# Patient Record
Sex: MALE | Race: OTHER | NOT HISPANIC OR LATINO | URBAN - METROPOLITAN AREA
[De-identification: names, ages, dates, MRNs, and addresses within clinical notes are randomized per-mention and may not be internally consistent; named-entity substitution may affect disease eponyms.]

---

## 2017-11-16 VITALS
DIASTOLIC BLOOD PRESSURE: 51 MMHG | RESPIRATION RATE: 18 BRPM | TEMPERATURE: 98 F | HEART RATE: 62 BPM | SYSTOLIC BLOOD PRESSURE: 93 MMHG | OXYGEN SATURATION: 97 %

## 2017-11-16 NOTE — H&P ADULT - ASSESSMENT
77 yr old M with PMHx of HTN, hyperlipidemia, DM, stage III CKD, CAD s/p prior CABG, with CCS Angina Class III equivalent symptoms and abnormal NST who presents to [facility] for recommended cardiac catheterization with possible intervention.    ASA III            Mallampati II    Risks & benefits of procedure and alternative therapy have been explained to the patient including but not limited to: allergic reaction, bleeding w/possible need for blood transfusion, infection, renal and vascular compromise, limb damage, arrhythmia, stroke, vessel dissection/perforation, Myocardial infarction, emergent CABG. Informed consent obtained and in chart.

## 2017-11-16 NOTE — H&P ADULT - PMH
CAD (coronary artery disease)    CKD (chronic kidney disease) stage 3, GFR 30-59 ml/min    Diabetes    HLD (hyperlipidemia)    HTN (hypertension)

## 2017-11-16 NOTE — H&P ADULT - HISTORY OF PRESENT ILLNESS
Patient is a 78yo M with PMHx of CAD s/p CABG (1996), CKD stage III, IDDM, HLD, HTN, gout, glaucoma, PVD and chronic venous insufficiency s/p left endovenous laser tx who presented to his cardiologist's office complaining of 7-8/10 pressure-like SSCP with radiation to the shoulder with associated SOB on ambulation of 1 block, resolving with rest. Patient also endorses 3 pillow orthopnea and PND. Additionally, patient has intermittent claudication, R>L, on ambulation that starts at the thigh and radiates down to the feet, resolving after a few minutes of rest. NST 10/13/17 revealed moderate sized, severe, reversible myocardial perfusion defect in the inferior and inferolateral wall, EF 69%. Echo on 10/13/17 revealed EF 55-60%, no wall motion abnormalities, trace AR, mild MR, PAP 37mmHg, mild TR. In light of patient's risk factors, class III anginal symptoms and abnormal NST, patient is now referred to [facility] for recommended cardiac catheterization with possible intervention. Patient is a 76yo M with PMHx of CAD s/p CABG (1996), CKD stage III, IDDM, HLD, HTN, gout, glaucoma, PVD and chronic venous insufficiency s/p left endovenous laser tx who presented to his cardiologist's office complaining of 7-8/10 pressure-like SSCP with radiation to the shoulder with associated SOB on ambulation of 1 block, resolving with rest. Patient also endorses 3 pillow orthopnea and PND. Additionally, patient has intermittent claudication, R>L, on ambulation that starts at the thigh and radiates down to the feet, resolving after a few minutes of rest. Patient denies fatigue, palpitations, N/V, hematochezia, melena, LE edema, dizziness, syncope. NST 10/13/17 revealed moderate sized, severe, reversible myocardial perfusion defect in the inferior and inferolateral wall, EF 69%. Echo on 10/13/17 revealed EF 55-60%, no wall motion abnormalities, trace AR, mild MR, PAP 37mmHg, mild TR. In light of patient's risk factors, class III anginal symptoms and abnormal NST, patient is now referred to [facility] for recommended cardiac catheterization with possible intervention. Patient is a 76yo M with PMHx of HTN, hyperlipidemia, DM, stage III CKD (Cr 1.8 by labs 9/2017), CAD s/p 4VCABG (1996), gout, PVD and chronic venous insufficiency s/p left endovenous laser tx who presented to his cardiologist's office complaining of 7-8/10 pressure-like SSCP with radiation to the shoulder with associated SOB on ambulation of 1 block, resolving with rest. Patient also endorses 3 pillow orthopnea and PND. Additionally, patient has intermittent claudication, R>L, on ambulation that starts at the thigh and radiates down to the feet, resolving after a few minutes of rest. Patient denies fatigue, palpitations, N/V, hematochezia, melena, LE edema, dizziness, syncope. NST 10/13/17 revealed moderate sized, severe, reversible myocardial perfusion defect in the inferior and inferolateral wall, EF 69%. Echo on 10/13/17 revealed EF 55-60%, no wall motion abnormalities, trace AR, mild MR, PAP 37mmHg, mild TR. In light of patient's risk factors, class III anginal symptoms and abnormal NST, patient is now referred to [facility] for recommended cardiac catheterization with possible intervention.

## 2017-11-17 ENCOUNTER — OUTPATIENT (OUTPATIENT)
Dept: OUTPATIENT SERVICES | Facility: HOSPITAL | Age: 77
LOS: 1 days | Discharge: NURSING, REHAB OR LONG-TERM CARE | End: 2017-11-17
Payer: MEDICARE

## 2017-11-17 DIAGNOSIS — N18.3 CHRONIC KIDNEY DISEASE, STAGE 3 (MODERATE): ICD-10-CM

## 2017-11-17 DIAGNOSIS — I20.9 ANGINA PECTORIS, UNSPECIFIED: ICD-10-CM

## 2017-11-17 DIAGNOSIS — Z95.1 PRESENCE OF AORTOCORONARY BYPASS GRAFT: Chronic | ICD-10-CM

## 2017-11-17 DIAGNOSIS — H26.40 UNSPECIFIED SECONDARY CATARACT: Chronic | ICD-10-CM

## 2017-11-17 LAB
ALBUMIN SERPL ELPH-MCNC: 4.3 G/DL — SIGNIFICANT CHANGE UP (ref 3.3–5)
ALP SERPL-CCNC: 54 U/L — SIGNIFICANT CHANGE UP (ref 40–120)
ALT FLD-CCNC: 16 U/L — SIGNIFICANT CHANGE UP (ref 10–45)
ANION GAP SERPL CALC-SCNC: 15 MMOL/L — SIGNIFICANT CHANGE UP (ref 5–17)
APTT BLD: 27.7 SEC — SIGNIFICANT CHANGE UP (ref 27.5–37.4)
AST SERPL-CCNC: 16 U/L — SIGNIFICANT CHANGE UP (ref 10–40)
BASOPHILS NFR BLD AUTO: 0.4 % — SIGNIFICANT CHANGE UP (ref 0–2)
BILIRUB SERPL-MCNC: 0.6 MG/DL — SIGNIFICANT CHANGE UP (ref 0.2–1.2)
BUN SERPL-MCNC: 14 MG/DL — SIGNIFICANT CHANGE UP (ref 7–23)
CALCIUM SERPL-MCNC: 9.7 MG/DL — SIGNIFICANT CHANGE UP (ref 8.4–10.5)
CHLORIDE SERPL-SCNC: 96 MMOL/L — SIGNIFICANT CHANGE UP (ref 96–108)
CK MB CFR SERPL CALC: 1.8 NG/ML — SIGNIFICANT CHANGE UP (ref 0–6.7)
CK SERPL-CCNC: 93 U/L — SIGNIFICANT CHANGE UP (ref 30–200)
CO2 SERPL-SCNC: 24 MMOL/L — SIGNIFICANT CHANGE UP (ref 22–31)
CREAT SERPL-MCNC: 1.48 MG/DL — HIGH (ref 0.5–1.3)
EOSINOPHIL NFR BLD AUTO: 2.6 % — SIGNIFICANT CHANGE UP (ref 0–6)
GLUCOSE BLDC GLUCOMTR-MCNC: 105 MG/DL — HIGH (ref 70–99)
GLUCOSE SERPL-MCNC: 141 MG/DL — HIGH (ref 70–99)
HBA1C BLD-MCNC: 6.4 % — HIGH (ref 4–5.6)
HCT VFR BLD CALC: 44 % — SIGNIFICANT CHANGE UP (ref 39–50)
HGB BLD-MCNC: 14.8 G/DL — SIGNIFICANT CHANGE UP (ref 13–17)
INR BLD: 1.04 — SIGNIFICANT CHANGE UP (ref 0.88–1.16)
LYMPHOCYTES # BLD AUTO: 25.5 % — SIGNIFICANT CHANGE UP (ref 13–44)
MCHC RBC-ENTMCNC: 27.3 PG — SIGNIFICANT CHANGE UP (ref 27–34)
MCHC RBC-ENTMCNC: 33.6 G/DL — SIGNIFICANT CHANGE UP (ref 32–36)
MCV RBC AUTO: 81.2 FL — SIGNIFICANT CHANGE UP (ref 80–100)
MONOCYTES NFR BLD AUTO: 13.8 % — SIGNIFICANT CHANGE UP (ref 2–14)
NEUTROPHILS NFR BLD AUTO: 57.7 % — SIGNIFICANT CHANGE UP (ref 43–77)
PLATELET # BLD AUTO: 251 K/UL — SIGNIFICANT CHANGE UP (ref 150–400)
POTASSIUM SERPL-MCNC: 4.5 MMOL/L — SIGNIFICANT CHANGE UP (ref 3.5–5.3)
POTASSIUM SERPL-SCNC: 4.5 MMOL/L — SIGNIFICANT CHANGE UP (ref 3.5–5.3)
PROT SERPL-MCNC: 7.7 G/DL — SIGNIFICANT CHANGE UP (ref 6–8.3)
PROTHROM AB SERPL-ACNC: 11.5 SEC — SIGNIFICANT CHANGE UP (ref 9.8–12.7)
RBC # BLD: 5.42 M/UL — SIGNIFICANT CHANGE UP (ref 4.2–5.8)
RBC # FLD: 14.7 % — SIGNIFICANT CHANGE UP (ref 10.3–16.9)
SODIUM SERPL-SCNC: 135 MMOL/L — SIGNIFICANT CHANGE UP (ref 135–145)
WBC # BLD: 7.7 K/UL — SIGNIFICANT CHANGE UP (ref 3.8–10.5)
WBC # FLD AUTO: 7.7 K/UL — SIGNIFICANT CHANGE UP (ref 3.8–10.5)

## 2017-11-17 PROCEDURE — 83036 HEMOGLOBIN GLYCOSYLATED A1C: CPT

## 2017-11-17 PROCEDURE — C1894: CPT

## 2017-11-17 PROCEDURE — 85025 COMPLETE CBC W/AUTO DIFF WBC: CPT

## 2017-11-17 PROCEDURE — 85610 PROTHROMBIN TIME: CPT

## 2017-11-17 PROCEDURE — C1769: CPT

## 2017-11-17 PROCEDURE — 82553 CREATINE MB FRACTION: CPT

## 2017-11-17 PROCEDURE — 85730 THROMBOPLASTIN TIME PARTIAL: CPT

## 2017-11-17 PROCEDURE — 82962 GLUCOSE BLOOD TEST: CPT

## 2017-11-17 PROCEDURE — 82550 ASSAY OF CK (CPK): CPT

## 2017-11-17 PROCEDURE — C1887: CPT

## 2017-11-17 PROCEDURE — C1889: CPT

## 2017-11-17 PROCEDURE — 80053 COMPREHEN METABOLIC PANEL: CPT

## 2017-11-17 PROCEDURE — 93459 L HRT ART/GRFT ANGIO: CPT

## 2017-11-17 PROCEDURE — C1760: CPT

## 2017-11-17 RX ORDER — CHLORHEXIDINE GLUCONATE 213 G/1000ML
1 SOLUTION TOPICAL ONCE
Qty: 0 | Refills: 0 | Status: DISCONTINUED | OUTPATIENT
Start: 2017-11-17 | End: 2017-11-17

## 2017-11-17 RX ORDER — SODIUM CHLORIDE 9 MG/ML
500 INJECTION INTRAMUSCULAR; INTRAVENOUS; SUBCUTANEOUS
Qty: 0 | Refills: 0 | Status: DISCONTINUED | OUTPATIENT
Start: 2017-11-17 | End: 2017-11-17

## 2017-11-17 RX ORDER — METOPROLOL TARTRATE 50 MG
1 TABLET ORAL
Qty: 30 | Refills: 3
Start: 2017-11-17 | End: 2018-03-16

## 2017-11-17 RX ORDER — ASPIRIN/CALCIUM CARB/MAGNESIUM 324 MG
325 TABLET ORAL ONCE
Qty: 0 | Refills: 0 | Status: COMPLETED | OUTPATIENT
Start: 2017-11-17 | End: 2017-11-17

## 2017-11-17 RX ORDER — CLOPIDOGREL BISULFATE 75 MG/1
600 TABLET, FILM COATED ORAL ONCE
Qty: 0 | Refills: 0 | Status: COMPLETED | OUTPATIENT
Start: 2017-11-17 | End: 2017-11-17

## 2017-11-17 RX ORDER — AMLODIPINE BESYLATE 2.5 MG/1
1 TABLET ORAL
Qty: 30 | Refills: 3
Start: 2017-11-17 | End: 2018-03-16

## 2017-11-17 RX ADMIN — CLOPIDOGREL BISULFATE 600 MILLIGRAM(S): 75 TABLET, FILM COATED ORAL at 10:44

## 2017-11-17 RX ADMIN — Medication 325 MILLIGRAM(S): at 10:44

## 2017-11-17 RX ADMIN — SODIUM CHLORIDE 125 MILLILITER(S): 9 INJECTION INTRAMUSCULAR; INTRAVENOUS; SUBCUTANEOUS at 10:44

## 2017-11-17 NOTE — PROGRESS NOTE ADULT - SUBJECTIVE AND OBJECTIVE BOX
Interventional Cardiology PA SDA Discharge Note    Patient seen and examined at bedside resting comfortably, denies current complaints.    Afebrile, VSS    Ext:    		Right groin soft, NT, no hematoma/bruit    Pulses:    intact right DP/PT to baseline      A/P:  77 yr old M with PMHx of HTN, hyperlipidemia, DM, stage III CKD, CAD s/p prior CABG, with CCS Angina Class III equivalent symptoms and abnormal NST who presents to [facility] for recommended cardiac catheterization. Patient s/p diagnostic procedure revealing 75% LM stenosis, 100% pLAD stenosis, 70% pLCx stenosis, 99% OM1 stenosis, 90% pRCA stenosis, 100% mRCA stenosis, L-R collaterals from LAD. Patent SVG-LAD, patent SVG-D1, 100% SVG-OMB occlusion, SVG-RCA could not be seen. Patient recommended to continue medical therapy and follow-up with Dr. Driver in 2 weeks. Patient instructed to discontinue HCTZ, switch Metoprolol Tartrate to Metoprolol Succinate 50mg PO daily and start Norvasc 2.5mg PO daily. All new prescriptions sent to pharmacy.                  1.	Stable for discharge as per attending Dr. Driver after bed rest, pt voids, groin stable and 30 minutes of ambulation.  2.	Follow-up with PMD/Cardiologist _Dr. Driver in 2 weeks  3.	Discharged forms signed and copies in chart

## 2021-01-04 PROBLEM — I25.10 ATHEROSCLEROTIC HEART DISEASE OF NATIVE CORONARY ARTERY WITHOUT ANGINA PECTORIS: Chronic | Status: ACTIVE | Noted: 2017-11-16

## 2021-01-04 PROBLEM — E78.5 HYPERLIPIDEMIA, UNSPECIFIED: Chronic | Status: ACTIVE | Noted: 2017-11-16

## 2021-01-04 PROBLEM — I10 ESSENTIAL (PRIMARY) HYPERTENSION: Chronic | Status: ACTIVE | Noted: 2017-11-16

## 2021-01-04 PROBLEM — E11.9 TYPE 2 DIABETES MELLITUS WITHOUT COMPLICATIONS: Chronic | Status: ACTIVE | Noted: 2017-11-16

## 2021-01-04 PROBLEM — N18.3 CHRONIC KIDNEY DISEASE, STAGE 3 (MODERATE): Chronic | Status: ACTIVE | Noted: 2017-11-16

## 2021-01-07 PROBLEM — Z00.00 ENCOUNTER FOR PREVENTIVE HEALTH EXAMINATION: Status: ACTIVE | Noted: 2021-01-07

## 2021-01-08 ENCOUNTER — OUTPATIENT (OUTPATIENT)
Dept: OUTPATIENT SERVICES | Facility: HOSPITAL | Age: 81
LOS: 1 days | End: 2021-01-08
Payer: COMMERCIAL

## 2021-01-08 ENCOUNTER — APPOINTMENT (OUTPATIENT)
Dept: ULTRASOUND IMAGING | Facility: IMAGING CENTER | Age: 81
End: 2021-01-08
Payer: MEDICARE

## 2021-01-08 DIAGNOSIS — Z12.9 ENCOUNTER FOR SCREENING FOR MALIGNANT NEOPLASM, SITE UNSPECIFIED: ICD-10-CM

## 2021-01-08 DIAGNOSIS — H26.40 UNSPECIFIED SECONDARY CATARACT: Chronic | ICD-10-CM

## 2021-01-08 DIAGNOSIS — Z95.1 PRESENCE OF AORTOCORONARY BYPASS GRAFT: Chronic | ICD-10-CM

## 2021-01-08 PROCEDURE — 76700 US EXAM ABDOM COMPLETE: CPT | Mod: 26

## 2021-01-08 PROCEDURE — 76700 US EXAM ABDOM COMPLETE: CPT

## 2021-01-20 ENCOUNTER — APPOINTMENT (OUTPATIENT)
Dept: CT IMAGING | Facility: IMAGING CENTER | Age: 81
End: 2021-01-20
Payer: MEDICARE

## 2021-01-20 ENCOUNTER — OUTPATIENT (OUTPATIENT)
Dept: OUTPATIENT SERVICES | Facility: HOSPITAL | Age: 81
LOS: 1 days | End: 2021-01-20
Payer: COMMERCIAL

## 2021-01-20 DIAGNOSIS — Z12.9 ENCOUNTER FOR SCREENING FOR MALIGNANT NEOPLASM, SITE UNSPECIFIED: ICD-10-CM

## 2021-01-20 DIAGNOSIS — H26.40 UNSPECIFIED SECONDARY CATARACT: Chronic | ICD-10-CM

## 2021-01-20 DIAGNOSIS — Z95.1 PRESENCE OF AORTOCORONARY BYPASS GRAFT: Chronic | ICD-10-CM

## 2021-01-20 PROCEDURE — 74176 CT ABD & PELVIS W/O CONTRAST: CPT | Mod: 26

## 2021-01-20 PROCEDURE — 74176 CT ABD & PELVIS W/O CONTRAST: CPT

## 2023-03-29 VITALS
SYSTOLIC BLOOD PRESSURE: 117 MMHG | DIASTOLIC BLOOD PRESSURE: 61 MMHG | HEART RATE: 76 BPM | TEMPERATURE: 98 F | RESPIRATION RATE: 17 BRPM | OXYGEN SATURATION: 99 %

## 2023-03-29 RX ORDER — CHLORHEXIDINE GLUCONATE 213 G/1000ML
1 SOLUTION TOPICAL ONCE
Refills: 0 | Status: DISCONTINUED | OUTPATIENT
Start: 2023-03-31 | End: 2023-03-31

## 2023-03-29 RX ORDER — RANOLAZINE 500 MG/1
1 TABLET, FILM COATED, EXTENDED RELEASE ORAL
Qty: 0 | Refills: 0 | DISCHARGE

## 2023-03-29 RX ORDER — METOPROLOL TARTRATE 50 MG
1 TABLET ORAL
Qty: 0 | Refills: 0 | DISCHARGE

## 2023-03-29 NOTE — H&P ADULT - ASSESSMENT
82 year old M with PMHx of CAD s/p CABGx4 ( SVG to LAD, D1, and OM3) in 1996 in [facility], DM II, HLD, HTN, PVD, PAD, CKD stage III (Cr 1.79 from 12/21/2022), glaucoma,  who presented to his outpatient cardiologist for f/u after ECHO and stress test. Pt complains of severe LE pain (L>R, onset 1 year ago that prevents him from completing his daily activities or walking and exercising regularly. Currently, he is unable to ambulate more than 2-3 blocks d/t leg pain. He was referred for vascular consult for RLE PVD d/t NELA 0.63 (7/19/22). Pt denies any palpitations, LE edema, dizziness, orthopnea, PND, or any other symptoms.    Cardiac cath 11/17/2017: Patent SVG to LAD and to D1. SVG to OM3 and RCA were not visualized. % occluded with left to right collaterals to distal RCA.  Echo 2/24/23: LVEF 55-60%. Grade I diastolic dysfunction. Mild MR. Trace TR. RT ventricular systolic presusre mildly elevated. Mild pHTN. Trace TR.   NST 2/24/23: Medium sized, partially reversible myocardial perfusion defect of the inferior and lateral wall consistent with infarction oc the basal to mid interior wall with ischemia of the distal inferior and lateral wall. Post stress EF 47%. Mod reduced wall motion in the inferior and inferolateral segments. In light of pt's katia factors and current symptoms, he is now referred to [facility] for LE angiography.      Pt H+H, platelets stable, Pt without any reports of BRBPR, hematuria, prior ICH, melena and no recent or previous GI bleed.   Loaded with: [X]81mg ASA, [ ]75mg Plavix,  [ ] ASA 325mg [X] Plavix 600mg, [ ] No Load [ ] 2/2ndry to     Pt Cr. baseline 1.7 on last labs on admission and LVEF 60%, pre cath fluids ordered per protocol [X]250ml IV bolus over 30min, [ ] 75cc x2hrs, [ ] 50cc x2hrs,       Malampatti 2  ASA 3  Pt is a Candidate for Moderate Sedation   Risks & benefits of procedure and alternative therapy have been explained to the patient including but not limited to: allergic reaction, bleeding w/possible need for blood transfusion, infection, renal and vascular compromise, limb damage, stroke, Myocardial infarction, vessel dissection/perforation, emergent Vascular Surgery. Informed consent obtained and in chart.           82 year old M with PMHx of CAD s/p CABGx4 ( SVG to LAD, D1, and OM3) in 1996 in [facility], DM II, HLD, HTN, PVD, PAD, CKD stage III (Cr 1.79 from 12/21/2022), glaucoma,  who presented to his outpatient cardiologist for f/u after ECHO and stress test. Pt complains of severe LE pain (L>R, onset 1 year ago that prevents him from completing his daily activities or walking and exercising regularly. Currently, he is unable to ambulate more than 2-3 blocks d/t leg pain. He was referred for vascular consult for RLE PVD d/t NELA 0.63 (7/19/22). Pt denies any palpitations, LE edema, dizziness, orthopnea, PND, or any other symptoms.    Cardiac cath 11/17/2017: Patent SVG to LAD and to D1. SVG to OM3 and RCA were not visualized. % occluded with left to right collaterals to distal RCA.  Echo 2/24/23: LVEF 55-60%. Grade I diastolic dysfunction. Mild MR. Trace TR. RT ventricular systolic presusre mildly elevated. Mild pHTN. Trace TR.   NST 2/24/23: Medium sized, partially reversible myocardial perfusion defect of the inferior and lateral wall consistent with infarction oc the basal to mid interior wall with ischemia of the distal inferior and lateral wall. Post stress EF 47%. Mod reduced wall motion in the inferior and inferolateral segments. In light of pt's katia factors and current symptoms, he is now referred to [facility] for LE angiography.      Pt H+H, platelets stable, Pt without any reports of BRBPR, hematuria, prior ICH, melena and no recent or previous GI bleed.   Loaded with: [X]81mg ASA, [ ]75mg Plavix,  [ ] ASA 325mg [X] Plavix 600mg, [ ] No Load [ ] 2/2ndry to     Pt Cr. baseline 1.7 on last labs on admission today 1.6, and LVEF 60%, pre cath fluids ordered per protocol [X]250ml IV bolus over 30min, [ ] 75cc x2hrs, [ ] 50cc x2hrs,       Malampatti 2  ASA 3  Pt is a Candidate for Moderate Sedation   Risks & benefits of procedure and alternative therapy have been explained to the patient including but not limited to: allergic reaction, bleeding w/possible need for blood transfusion, infection, renal and vascular compromise, limb damage, stroke, Myocardial infarction, vessel dissection/perforation, emergent Vascular Surgery. Informed consent obtained and in chart.

## 2023-03-29 NOTE — H&P ADULT - HISTORY OF PRESENT ILLNESS
Cardiologist: Dr. Triston FLORES:  Pharmacy:  Escort:    82 year old M with PMHx of CAD s/p CABGx4 (1996 in [facility]), DM, HLD, HTN, PVD, PAD, CKD stage III (Cr 1.79 from 12/21/2022), glaucoma,  who presented to his outpatient cardiologist for f/u after ECHO and stress test. Pt complains of severe LE pain (L>R, onset 1 year ago that prevents him from completing his daily activities or walking and exercising regularly. Currently, he is unable to ambulate more than 2-3 blocks d/t leg pain. He was referred for vascular consult for RLE PVD d/t NELA 0.63 (7/19/22). Pt denies any palpitations, LE edema, dizziness, orthopnea, PND, or any other symptoms.    Cardiac cath 11/17/2017: Patent SVG to LAD and to D1. SVG to OM3 and RCA were not visualized. % occluded with left to right collaterals to distal RCA.  Echo 2/24/23: LVEF 55-60%. Grade I diastolic dysfunction. Mild MR. Trace TR. RT ventricular systolic presusre mildly elevated. Mild pHTN. Trace TR.   NST 2/24/23: Medium sized, patrially reversible myocardial perfusion defect of the inferior and lateral wall consistent with infarction oc the basal to mid interior wall with ischemia of the distal inferior and lateral wall. Post stress EF 47%. Mod reduced wall motion in the inferior and inferolateral segments.    In light of pt's katia factors and current symptoms, he is now referred to [facility] for LE angiography.    Cardiologist: Dr. Triston FLORES:  Pharmacy:  Escort:    82 year old M with PMHx of CAD s/p CABGx4 ( SVG to LAD, D1, and OM3) in 1996 in [facility], DM II, HLD, HTN, PVD, PAD, CKD stage III (Cr 1.79 from 12/21/2022), glaucoma,  who presented to his outpatient cardiologist for f/u after ECHO and stress test. Pt complains of severe LE pain (L>R, onset 1 year ago that prevents him from completing his daily activities or walking and exercising regularly. Currently, he is unable to ambulate more than 2-3 blocks d/t leg pain. He was referred for vascular consult for RLE PVD d/t NELA 0.63 (7/19/22). Pt denies any palpitations, LE edema, dizziness, orthopnea, PND, or any other symptoms.    Cardiac cath 11/17/2017: Patent SVG to LAD and to D1. SVG to OM3 and RCA were not visualized. % occluded with left to right collaterals to distal RCA.  Echo 2/24/23: LVEF 55-60%. Grade I diastolic dysfunction. Mild MR. Trace TR. RT ventricular systolic presusre mildly elevated. Mild pHTN. Trace TR.   NST 2/24/23: Medium sized, partially reversible myocardial perfusion defect of the inferior and lateral wall consistent with infarction oc the basal to mid interior wall with ischemia of the distal inferior and lateral wall. Post stress EF 47%. Mod reduced wall motion in the inferior and inferolateral segments.    In light of pt's katia factors and current symptoms, he is now referred to [facility] for LE angiography.    INCOMPLETE    Cardiologist: Dr. Triston Driver  COVID:  Pharmacy:  Escort:    82 year old M with PMHx of CAD s/p CABGx4 ( SVG to LAD, D1, and OM3) in 1996 in [facility], DM II, HLD, HTN, PVD, PAD, CKD stage III (Cr 1.79 from 12/21/2022), glaucoma,  who presented to his outpatient cardiologist for f/u after ECHO and stress test. Pt complains of severe LE pain (L>R, onset 1 year ago that prevents him from completing his daily activities or walking and exercising regularly. Currently, he is unable to ambulate more than 2-3 blocks d/t leg pain. He was referred for vascular consult for RLE PVD d/t NELA 0.63 (7/19/22). Pt denies any palpitations, LE edema, dizziness, orthopnea, PND, or any other symptoms.    Cardiac cath 11/17/2017: Patent SVG to LAD and to D1. SVG to OM3 and RCA were not visualized. % occluded with left to right collaterals to distal RCA.  Echo 2/24/23: LVEF 55-60%. Grade I diastolic dysfunction. Mild MR. Trace TR. RT ventricular systolic presusre mildly elevated. Mild pHTN. Trace TR.   NST 2/24/23: Medium sized, partially reversible myocardial perfusion defect of the inferior and lateral wall consistent with infarction oc the basal to mid interior wall with ischemia of the distal inferior and lateral wall. Post stress EF 47%. Mod reduced wall motion in the inferior and inferolateral segments.    In light of pt's katia factors and current symptoms, he is now referred to [facility] for LE angiography.    Cardiologist: Dr. Triston FLORES:  Pharmacy:  Escort:    82 year old M with PMHx of CAD s/p CABGx4 ( SVG to LAD, D1, and OM3) in 1996 in [facility], DM II, HLD, HTN, PVD, PAD, CKD stage III (Cr 1.79 from 12/21/2022), glaucoma,  who presented to his outpatient cardiologist for f/u after ECHO and stress test. Pt complains of severe LE pain (L>R, onset 1 year ago that prevents him from completing his daily activities or walking and exercising regularly. Currently, he is unable to ambulate more than 2-3 blocks d/t leg pain. He was referred for vascular consult for RLE PVD d/t NELA 0.63 (7/19/22). Pt denies any palpitations, LE edema, dizziness, orthopnea, PND, or any other symptoms.    Cardiac cath 11/17/2017: Patent SVG to LAD and to D1. SVG to OM3 and RCA were not visualized. % occluded with left to right collaterals to distal RCA.  Echo 2/24/23: LVEF 55-60%. Grade I diastolic dysfunction. Mild MR. Trace TR. RT ventricular systolic presusre mildly elevated. Mild pHTN. Trace TR.   NST 2/24/23: Medium sized, partially reversible myocardial perfusion defect of the inferior and lateral wall consistent with infarction oc the basal to mid interior wall with ischemia of the distal inferior and lateral wall. Post stress EF 47%. Mod reduced wall motion in the inferior and inferolateral segments.    In light of pt's katia factors and current symptoms, he is now referred to [facility] for LE angiography.    Cardiologist: Dr. Triston Driver  COVID:Negative 3-31 in [facility]   Pharmacy: [facility] [place] [phone]  Escort:[place]     82 year old M with PMHx of CAD s/p CABGx4 ( SVG to LAD, D1, and OM3) in 1996 in [facility], DM II, HLD, HTN, PVD, PAD, CKD stage III (Cr 1.79 from 12/21/2022), glaucoma,  who presented to his outpatient cardiologist for f/u after ECHO and stress test. Pt complains of severe LE pain (L>R, onset 1 year ago that prevents him from completing his daily activities or walking and exercising regularly. Currently, he is unable to ambulate more than 2-3 blocks d/t leg pain. He was referred for vascular consult for RLE PVD d/t NELA 0.63 (7/19/22). Pt denies any palpitations, LE edema, dizziness, orthopnea, PND, or any other symptoms.    Cardiac cath 11/17/2017: Patent SVG to LAD and to D1. SVG to OM3 and RCA were not visualized. % occluded with left to right collaterals to distal RCA.  Echo 2/24/23: LVEF 55-60%. Grade I diastolic dysfunction. Mild MR. Trace TR. RT ventricular systolic presusre mildly elevated. Mild pHTN. Trace TR.   NST 2/24/23: Medium sized, partially reversible myocardial perfusion defect of the inferior and lateral wall consistent with infarction oc the basal to mid interior wall with ischemia of the distal inferior and lateral wall. Post stress EF 47%. Mod reduced wall motion in the inferior and inferolateral segments.    In light of pt's katia factors and current symptoms, he is now referred to [facility] for LE angiography.

## 2023-03-29 NOTE — H&P ADULT - NSICDXPASTMEDICALHX_GEN_ALL_CORE_FT
PAST MEDICAL HISTORY:  CAD (coronary artery disease)     CKD (chronic kidney disease) stage 3, GFR 30-59 ml/min     Diabetes     HLD (hyperlipidemia)     HTN (hypertension)

## 2023-03-29 NOTE — H&P ADULT - NSHPLABSRESULTS_GEN_ALL_CORE
14.0   8.37  )-----------( 265      ( 31 Mar 2023 09:34 )             43.6     Mg     1.9     03-31        PT/INR - ( 31 Mar 2023 09:34 )   PT: 13.2 sec;   INR: 1.11     PTT - ( 31 Mar 2023 09:34 )  PTT:28.3 sec    EKG: NSR with TWI in V1 14.0   8.37  )-----------( 265      ( 31 Mar 2023 09:34 )             43.6   03-31    134<L>  |  100  |  14  ----------------------------<  180<H>  4.8   |  24  |  1.62<H>    Ca    9.6      31 Mar 2023 09:34  Mg     1.9     03-31    TPro  7.2  /  Alb  4.1  /  TBili  0.4  /  DBili  x   /  AST  18  /  ALT  16  /  AlkPhos  73  03-31    PT/INR - ( 31 Mar 2023 09:34 )   PT: 13.2 sec;   INR: 1.11     PTT - ( 31 Mar 2023 09:34 )  PTT:28.3 sec    EKG: NSR with TWI in V1

## 2023-03-29 NOTE — H&P ADULT - NSICDXFAMILYHX_GEN_ALL_CORE_FT
FAMILY HISTORY:  Sibling  Still living? Unknown  FHx: diabetes mellitus, Age at diagnosis: Age Unknown

## 2023-03-31 ENCOUNTER — INPATIENT (INPATIENT)
Facility: HOSPITAL | Age: 83
LOS: 0 days | Discharge: HOME | DRG: 272 | End: 2023-04-01
Attending: INTERNAL MEDICINE | Admitting: INTERNAL MEDICINE
Payer: MEDICARE

## 2023-03-31 DIAGNOSIS — Z95.1 PRESENCE OF AORTOCORONARY BYPASS GRAFT: Chronic | ICD-10-CM

## 2023-03-31 DIAGNOSIS — H26.40 UNSPECIFIED SECONDARY CATARACT: Chronic | ICD-10-CM

## 2023-03-31 LAB
A1C WITH ESTIMATED AVERAGE GLUCOSE RESULT: 6 % — HIGH (ref 4–5.6)
ALBUMIN SERPL ELPH-MCNC: 4.1 G/DL — SIGNIFICANT CHANGE UP (ref 3.3–5)
ALP SERPL-CCNC: 73 U/L — SIGNIFICANT CHANGE UP (ref 40–120)
ALT FLD-CCNC: 16 U/L — SIGNIFICANT CHANGE UP (ref 10–45)
ANION GAP SERPL CALC-SCNC: 10 MMOL/L — SIGNIFICANT CHANGE UP (ref 5–17)
APTT BLD: 28.3 SEC — SIGNIFICANT CHANGE UP (ref 27.5–35.5)
AST SERPL-CCNC: 18 U/L — SIGNIFICANT CHANGE UP (ref 10–40)
BASOPHILS # BLD AUTO: 0.05 K/UL — SIGNIFICANT CHANGE UP (ref 0–0.2)
BASOPHILS NFR BLD AUTO: 0.6 % — SIGNIFICANT CHANGE UP (ref 0–2)
BILIRUB SERPL-MCNC: 0.4 MG/DL — SIGNIFICANT CHANGE UP (ref 0.2–1.2)
BUN SERPL-MCNC: 14 MG/DL — SIGNIFICANT CHANGE UP (ref 7–23)
CALCIUM SERPL-MCNC: 9.6 MG/DL — SIGNIFICANT CHANGE UP (ref 8.4–10.5)
CHLORIDE SERPL-SCNC: 100 MMOL/L — SIGNIFICANT CHANGE UP (ref 96–108)
CHOLEST SERPL-MCNC: 112 MG/DL — SIGNIFICANT CHANGE UP
CO2 SERPL-SCNC: 24 MMOL/L — SIGNIFICANT CHANGE UP (ref 22–31)
CREAT SERPL-MCNC: 1.62 MG/DL — HIGH (ref 0.5–1.3)
EGFR: 43 ML/MIN/1.73M2 — LOW
EOSINOPHIL # BLD AUTO: 0.29 K/UL — SIGNIFICANT CHANGE UP (ref 0–0.5)
EOSINOPHIL NFR BLD AUTO: 3.5 % — SIGNIFICANT CHANGE UP (ref 0–6)
ESTIMATED AVERAGE GLUCOSE: 126 MG/DL — HIGH (ref 68–114)
GLUCOSE BLDC GLUCOMTR-MCNC: 115 MG/DL — HIGH (ref 70–99)
GLUCOSE BLDC GLUCOMTR-MCNC: 123 MG/DL — HIGH (ref 70–99)
GLUCOSE BLDC GLUCOMTR-MCNC: 159 MG/DL — HIGH (ref 70–99)
GLUCOSE SERPL-MCNC: 180 MG/DL — HIGH (ref 70–99)
HCT VFR BLD CALC: 43.6 % — SIGNIFICANT CHANGE UP (ref 39–50)
HDLC SERPL-MCNC: 39 MG/DL — LOW
HGB BLD-MCNC: 14 G/DL — SIGNIFICANT CHANGE UP (ref 13–17)
IMM GRANULOCYTES NFR BLD AUTO: 0.2 % — SIGNIFICANT CHANGE UP (ref 0–0.9)
INR BLD: 1.11 — SIGNIFICANT CHANGE UP (ref 0.88–1.16)
ISTAT ACTK (ACTIVATED CLOTTING TIME KAOLIN): 257 SEC — HIGH (ref 74–137)
ISTAT ACTK (ACTIVATED CLOTTING TIME KAOLIN): 299 SEC — HIGH (ref 74–137)
LIPID PNL WITH DIRECT LDL SERPL: 43 MG/DL — SIGNIFICANT CHANGE UP
LYMPHOCYTES # BLD AUTO: 1.97 K/UL — SIGNIFICANT CHANGE UP (ref 1–3.3)
LYMPHOCYTES # BLD AUTO: 23.5 % — SIGNIFICANT CHANGE UP (ref 13–44)
MAGNESIUM SERPL-MCNC: 1.9 MG/DL — SIGNIFICANT CHANGE UP (ref 1.6–2.6)
MCHC RBC-ENTMCNC: 26.6 PG — LOW (ref 27–34)
MCHC RBC-ENTMCNC: 32.1 GM/DL — SIGNIFICANT CHANGE UP (ref 32–36)
MCV RBC AUTO: 82.7 FL — SIGNIFICANT CHANGE UP (ref 80–100)
MONOCYTES # BLD AUTO: 1.04 K/UL — HIGH (ref 0–0.9)
MONOCYTES NFR BLD AUTO: 12.4 % — SIGNIFICANT CHANGE UP (ref 2–14)
NEUTROPHILS # BLD AUTO: 5 K/UL — SIGNIFICANT CHANGE UP (ref 1.8–7.4)
NEUTROPHILS NFR BLD AUTO: 59.8 % — SIGNIFICANT CHANGE UP (ref 43–77)
NON HDL CHOLESTEROL: 73 MG/DL — SIGNIFICANT CHANGE UP
NRBC # BLD: 0 /100 WBCS — SIGNIFICANT CHANGE UP (ref 0–0)
PLATELET # BLD AUTO: 265 K/UL — SIGNIFICANT CHANGE UP (ref 150–400)
POTASSIUM SERPL-MCNC: 4.8 MMOL/L — SIGNIFICANT CHANGE UP (ref 3.5–5.3)
POTASSIUM SERPL-SCNC: 4.8 MMOL/L — SIGNIFICANT CHANGE UP (ref 3.5–5.3)
PROT SERPL-MCNC: 7.2 G/DL — SIGNIFICANT CHANGE UP (ref 6–8.3)
PROTHROM AB SERPL-ACNC: 13.2 SEC — SIGNIFICANT CHANGE UP (ref 10.5–13.4)
RBC # BLD: 5.27 M/UL — SIGNIFICANT CHANGE UP (ref 4.2–5.8)
RBC # FLD: 14.6 % — HIGH (ref 10.3–14.5)
SODIUM SERPL-SCNC: 134 MMOL/L — LOW (ref 135–145)
TRIGL SERPL-MCNC: 150 MG/DL — HIGH
WBC # BLD: 8.37 K/UL — SIGNIFICANT CHANGE UP (ref 3.8–10.5)
WBC # FLD AUTO: 8.37 K/UL — SIGNIFICANT CHANGE UP (ref 3.8–10.5)

## 2023-03-31 PROCEDURE — 75716 ARTERY X-RAYS ARMS/LEGS: CPT | Mod: 26,XU

## 2023-03-31 PROCEDURE — 37225: CPT

## 2023-03-31 RX ORDER — FUROSEMIDE 40 MG
20 TABLET ORAL DAILY
Refills: 0 | Status: DISCONTINUED | OUTPATIENT
Start: 2023-04-01 | End: 2023-04-01

## 2023-03-31 RX ORDER — CLOPIDOGREL BISULFATE 75 MG/1
600 TABLET, FILM COATED ORAL ONCE
Refills: 0 | Status: COMPLETED | OUTPATIENT
Start: 2023-03-31 | End: 2023-03-31

## 2023-03-31 RX ORDER — ASPIRIN/CALCIUM CARB/MAGNESIUM 324 MG
81 TABLET ORAL ONCE
Refills: 0 | Status: COMPLETED | OUTPATIENT
Start: 2023-03-31 | End: 2023-03-31

## 2023-03-31 RX ORDER — METOPROLOL TARTRATE 50 MG
50 TABLET ORAL DAILY
Refills: 0 | Status: DISCONTINUED | OUTPATIENT
Start: 2023-03-31 | End: 2023-04-01

## 2023-03-31 RX ORDER — LATANOPROST 0.05 MG/ML
1 SOLUTION/ DROPS OPHTHALMIC; TOPICAL
Refills: 0 | DISCHARGE

## 2023-03-31 RX ORDER — LISINOPRIL 2.5 MG/1
2.5 TABLET ORAL DAILY
Refills: 0 | Status: DISCONTINUED | OUTPATIENT
Start: 2023-03-31 | End: 2023-04-01

## 2023-03-31 RX ORDER — LATANOPROST 0.05 MG/ML
1 SOLUTION/ DROPS OPHTHALMIC; TOPICAL AT BEDTIME
Refills: 0 | Status: DISCONTINUED | OUTPATIENT
Start: 2023-03-31 | End: 2023-04-01

## 2023-03-31 RX ORDER — CALCIUM ACETATE 667 MG
667 TABLET ORAL
Refills: 0 | Status: DISCONTINUED | OUTPATIENT
Start: 2023-03-31 | End: 2023-04-01

## 2023-03-31 RX ORDER — CLOPIDOGREL BISULFATE 75 MG/1
75 TABLET, FILM COATED ORAL DAILY
Refills: 0 | Status: DISCONTINUED | OUTPATIENT
Start: 2023-04-01 | End: 2023-04-01

## 2023-03-31 RX ORDER — ATORVASTATIN CALCIUM 80 MG/1
40 TABLET, FILM COATED ORAL AT BEDTIME
Refills: 0 | Status: DISCONTINUED | OUTPATIENT
Start: 2023-03-31 | End: 2023-04-01

## 2023-03-31 RX ORDER — SODIUM CHLORIDE 9 MG/ML
500 INJECTION INTRAMUSCULAR; INTRAVENOUS; SUBCUTANEOUS
Refills: 0 | Status: DISCONTINUED | OUTPATIENT
Start: 2023-03-31 | End: 2023-03-31

## 2023-03-31 RX ORDER — TIMOLOL 0.5 %
1 DROPS OPHTHALMIC (EYE) DAILY
Refills: 0 | Status: DISCONTINUED | OUTPATIENT
Start: 2023-03-31 | End: 2023-04-01

## 2023-03-31 RX ORDER — SODIUM CHLORIDE 9 MG/ML
250 INJECTION INTRAMUSCULAR; INTRAVENOUS; SUBCUTANEOUS ONCE
Refills: 0 | Status: COMPLETED | OUTPATIENT
Start: 2023-03-31 | End: 2023-03-31

## 2023-03-31 RX ORDER — PANTOPRAZOLE SODIUM 20 MG/1
40 TABLET, DELAYED RELEASE ORAL
Refills: 0 | Status: DISCONTINUED | OUTPATIENT
Start: 2023-03-31 | End: 2023-04-01

## 2023-03-31 RX ORDER — SODIUM CHLORIDE 9 MG/ML
1000 INJECTION INTRAMUSCULAR; INTRAVENOUS; SUBCUTANEOUS
Refills: 0 | Status: DISCONTINUED | OUTPATIENT
Start: 2023-03-31 | End: 2023-03-31

## 2023-03-31 RX ORDER — CILOSTAZOL 100 MG/1
50 TABLET ORAL EVERY 12 HOURS
Refills: 0 | Status: DISCONTINUED | OUTPATIENT
Start: 2023-03-31 | End: 2023-04-01

## 2023-03-31 RX ORDER — FENOFIBRATE,MICRONIZED 130 MG
1 CAPSULE ORAL
Refills: 0 | DISCHARGE

## 2023-03-31 RX ORDER — TAMSULOSIN HYDROCHLORIDE 0.4 MG/1
0.4 CAPSULE ORAL AT BEDTIME
Refills: 0 | Status: DISCONTINUED | OUTPATIENT
Start: 2023-03-31 | End: 2023-04-01

## 2023-03-31 RX ORDER — SODIUM CHLORIDE 9 MG/ML
1000 INJECTION INTRAMUSCULAR; INTRAVENOUS; SUBCUTANEOUS
Refills: 0 | Status: DISCONTINUED | OUTPATIENT
Start: 2023-03-31 | End: 2023-04-01

## 2023-03-31 RX ORDER — INSULIN ASPART 100 [IU]/ML
25 INJECTION, SOLUTION SUBCUTANEOUS
Qty: 0 | Refills: 0 | DISCHARGE

## 2023-03-31 RX ORDER — ASPIRIN/CALCIUM CARB/MAGNESIUM 324 MG
81 TABLET ORAL DAILY
Refills: 0 | Status: DISCONTINUED | OUTPATIENT
Start: 2023-03-31 | End: 2023-04-01

## 2023-03-31 RX ORDER — HYDROCHLOROTHIAZIDE 25 MG
1 TABLET ORAL
Qty: 0 | Refills: 0 | DISCHARGE

## 2023-03-31 RX ORDER — ACETAMINOPHEN 500 MG
650 TABLET ORAL EVERY 6 HOURS
Refills: 0 | Status: DISCONTINUED | OUTPATIENT
Start: 2023-03-31 | End: 2023-04-01

## 2023-03-31 RX ORDER — AMLODIPINE BESYLATE 2.5 MG/1
5 TABLET ORAL DAILY
Refills: 0 | Status: DISCONTINUED | OUTPATIENT
Start: 2023-03-31 | End: 2023-04-01

## 2023-03-31 RX ADMIN — LATANOPROST 1 DROP(S): 0.05 SOLUTION/ DROPS OPHTHALMIC; TOPICAL at 22:10

## 2023-03-31 RX ADMIN — Medication 81 MILLIGRAM(S): at 10:41

## 2023-03-31 RX ADMIN — SODIUM CHLORIDE 75 MILLILITER(S): 9 INJECTION INTRAMUSCULAR; INTRAVENOUS; SUBCUTANEOUS at 10:42

## 2023-03-31 RX ADMIN — Medication 81 MILLIGRAM(S): at 17:14

## 2023-03-31 RX ADMIN — SODIUM CHLORIDE 50 MILLILITER(S): 9 INJECTION INTRAMUSCULAR; INTRAVENOUS; SUBCUTANEOUS at 17:14

## 2023-03-31 RX ADMIN — AMLODIPINE BESYLATE 5 MILLIGRAM(S): 2.5 TABLET ORAL at 17:14

## 2023-03-31 RX ADMIN — TAMSULOSIN HYDROCHLORIDE 0.4 MILLIGRAM(S): 0.4 CAPSULE ORAL at 21:12

## 2023-03-31 RX ADMIN — CILOSTAZOL 50 MILLIGRAM(S): 100 TABLET ORAL at 18:08

## 2023-03-31 RX ADMIN — ATORVASTATIN CALCIUM 40 MILLIGRAM(S): 80 TABLET, FILM COATED ORAL at 21:12

## 2023-03-31 RX ADMIN — SODIUM CHLORIDE 500 MILLILITER(S): 9 INJECTION INTRAMUSCULAR; INTRAVENOUS; SUBCUTANEOUS at 10:42

## 2023-03-31 RX ADMIN — CLOPIDOGREL BISULFATE 600 MILLIGRAM(S): 75 TABLET, FILM COATED ORAL at 10:41

## 2023-03-31 NOTE — PATIENT PROFILE ADULT - FALL HARM RISK - RISK INTERVENTIONS
Assistance OOB with selected safe patient handling equipment/Assistance with ambulation/Communicate Fall Risk and Risk Factors to all staff, patient, and family/Discuss with provider need for PT consult/Monitor gait and stability/Provide patient with walking aids - walker, cane, crutches/Reinforce activity limits and safety measures with patient and family/Sit up slowly, dangle for a short time, stand at bedside before walking/Use of alarms - bed, chair and/or voice tab/Visual Cue: Yellow wristband/Bed in lowest position, wheels locked, appropriate side rails in place/Call bell, personal items and telephone in reach/Instruct patient to call for assistance before getting out of bed or chair/Non-slip footwear when patient is out of bed/[place] to call system/Physically safe environment - no spills, clutter or unnecessary equipment/Purposeful Proactive Rounding/Room/bathroom lighting operational, light cord in reach

## 2023-03-31 NOTE — PATIENT PROFILE ADULT - FUNCTIONAL ASSESSMENT - BASIC MOBILITY SECTION LABEL
Importance of follow-up with his ophthalmologist was reiterated    Lab Results   Component Value Date    HGBA1C 6 9 (A) 06/16/2022 .

## 2023-03-31 NOTE — PATIENT PROFILE ADULT - MONEY FOR FOOD
Strength: Normal quadriceps development. Effusion: No effusion or swelling present. Ligamentous stability: No cruciate or collateral ligament instability. Neurologic and vascular: Skin is warm and well-perfused. Sensation is intact to light-touch. Special tests: Negative Gena sign. Right knee examination:    Gait: No use of assistive devices. No antalgic gait. Alignment: normal alignment. Inspection/skin: Skin is intact without erythema or ecchymosis. No gross deformity. Palpation: mild crepitus. no joint line tenderness present. Range of Motion: There is full range of motion. Strength: Normal quadriceps development. Effusion: No effusion or swelling present. Ligamentous stability: No cruciate or collateral ligament instability. Neurologic and vascular: Skin is warm and well-perfused. Sensation is intact to light-touch. Special tests: Negative Gena sign. Radiology:       Pertinent imaging was interpreted and reviewed with the patient today, both images and report. Left knee MRI on 6/30/2022      CONCLUSION:   1. 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 2. No meniscal, cruciate or ligamentous injuries. 3. Post-surgical infrapatellar - ligamentum mucosum plical scar. Thank you for the opportunity to provide your interpretation. 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 Chondromalacia of left patella Yes    Primary osteoarthritis of left knee        Office Procedures:  No orders of the defined types were placed in this encounter. Plan: Pertinent imaging was reviewed. The etiology, natural history, and treatment options for the disorder were discussed.   The roles of activity medication, antiinflammatories, injections, bracing, physical therapy, and surgical interventions were all described to the patient and questions were answered. Patient has a chondral fissure that Is causing mechanical symptoms. He has tried antiinflammatories and PT for 6 weeks with no improvement. He has pain primarily with manipulation of patella, going up and down stairs or getting up from a seated position. He has a very focal area of cartilage damage on the trochlea that is not very obvious on MRI but he is extremely symptomatic. He is also has some plica tenderness. At this time he is a candidate for a left knee arthroscopy with chondroplasty of the trochlea and plica excision. He would like to proceed with this. Risks, benefits and potential complications of arthroscopic left knee surgery were discussed with the patient. Risks discussed include but are not limited to bleeding, infection, anesthetic risk, injury to nerves and blood vessels, deep vein thrombosis, residual stiffness and weakness, and the need for revision surgery. The patient also understands that anesthetic risks include cardiopulmonary issues, drug reactions and even death. The patient voices an understanding of the importance of physical therapy and home exercises after surgery. All questions were answered. No personal history of blood clots. No Family history of blood clots. No personal history of bleeding disorders. No personal history antibiotic allergies. No personal intolerance or allergies to NSAIDs. No personal intolerance or allergies to narcotics. No personal diabetes. Plans to do postoperative physical therapy at [place]. Herber Ronda is in agreement with this plan. All questions were answered to patient's satisfaction and was encouraged to call with any further questions.      Total time spent for evaluation, education, and development of treatment plan: 35 minutes    Ahmet Tripathi MEAGAN  [address] [place] [address]  9/7/2022    During this exam, I, Briana Saldaña PA-C, functioned as a scribe for Dr. Brian Angeles. The history taking and physical examination were performed by Dr. Brian Angeles. All counseling during the appointment was performed between the patient and Dr. Brian nAgeles. 9/7/2022 2:36 PM    This dictation was performed with a verbal recognition program (DRAGON) and it was checked for errors. It is possible that there are still dictated errors within this office note. If so, please bring any areas to my attention for an addendum. All efforts were made to ensure that this office note is accurate. I attest that I met personally with the patient, performed the described exam, reviewed the radiographic studies and medical records associated with this patient and supervised the services that are described above.      Bela Garcia MD no

## 2023-04-01 ENCOUNTER — TRANSCRIPTION ENCOUNTER (OUTPATIENT)
Age: 83
End: 2023-04-01

## 2023-04-01 VITALS — TEMPERATURE: 98 F

## 2023-04-01 LAB
ANION GAP SERPL CALC-SCNC: 10 MMOL/L — SIGNIFICANT CHANGE UP (ref 5–17)
BUN SERPL-MCNC: 18 MG/DL — SIGNIFICANT CHANGE UP (ref 7–23)
CALCIUM SERPL-MCNC: 8.5 MG/DL — SIGNIFICANT CHANGE UP (ref 8.4–10.5)
CHLORIDE SERPL-SCNC: 102 MMOL/L — SIGNIFICANT CHANGE UP (ref 96–108)
CO2 SERPL-SCNC: 20 MMOL/L — LOW (ref 22–31)
CREAT SERPL-MCNC: 1.65 MG/DL — HIGH (ref 0.5–1.3)
EGFR: 42 ML/MIN/1.73M2 — LOW
GLUCOSE SERPL-MCNC: 144 MG/DL — HIGH (ref 70–99)
HCT VFR BLD CALC: 39.5 % — SIGNIFICANT CHANGE UP (ref 39–50)
HGB BLD-MCNC: 12.9 G/DL — LOW (ref 13–17)
MAGNESIUM SERPL-MCNC: 1.7 MG/DL — SIGNIFICANT CHANGE UP (ref 1.6–2.6)
MCHC RBC-ENTMCNC: 27.2 PG — SIGNIFICANT CHANGE UP (ref 27–34)
MCHC RBC-ENTMCNC: 32.7 GM/DL — SIGNIFICANT CHANGE UP (ref 32–36)
MCV RBC AUTO: 83.2 FL — SIGNIFICANT CHANGE UP (ref 80–100)
NRBC # BLD: 0 /100 WBCS — SIGNIFICANT CHANGE UP (ref 0–0)
PHOSPHATE SERPL-MCNC: 3.2 MG/DL — SIGNIFICANT CHANGE UP (ref 2.5–4.5)
PLATELET # BLD AUTO: 239 K/UL — SIGNIFICANT CHANGE UP (ref 150–400)
POTASSIUM SERPL-MCNC: 4.2 MMOL/L — SIGNIFICANT CHANGE UP (ref 3.5–5.3)
POTASSIUM SERPL-SCNC: 4.2 MMOL/L — SIGNIFICANT CHANGE UP (ref 3.5–5.3)
RBC # BLD: 4.75 M/UL — SIGNIFICANT CHANGE UP (ref 4.2–5.8)
RBC # FLD: 14.6 % — HIGH (ref 10.3–14.5)
SODIUM SERPL-SCNC: 132 MMOL/L — LOW (ref 135–145)
WBC # BLD: 10.86 K/UL — HIGH (ref 3.8–10.5)
WBC # FLD AUTO: 10.86 K/UL — HIGH (ref 3.8–10.5)

## 2023-04-01 PROCEDURE — C1760: CPT

## 2023-04-01 PROCEDURE — 83735 ASSAY OF MAGNESIUM: CPT

## 2023-04-01 PROCEDURE — 99239 HOSP IP/OBS DSCHRG MGMT >30: CPT

## 2023-04-01 PROCEDURE — 83036 HEMOGLOBIN GLYCOSYLATED A1C: CPT

## 2023-04-01 PROCEDURE — C2623: CPT

## 2023-04-01 PROCEDURE — 85347 COAGULATION TIME ACTIVATED: CPT

## 2023-04-01 PROCEDURE — 80061 LIPID PANEL: CPT

## 2023-04-01 PROCEDURE — 85730 THROMBOPLASTIN TIME PARTIAL: CPT

## 2023-04-01 PROCEDURE — C1884: CPT

## 2023-04-01 PROCEDURE — 36415 COLL VENOUS BLD VENIPUNCTURE: CPT

## 2023-04-01 PROCEDURE — C1894: CPT

## 2023-04-01 PROCEDURE — 82962 GLUCOSE BLOOD TEST: CPT

## 2023-04-01 PROCEDURE — C1769: CPT

## 2023-04-01 PROCEDURE — 80048 BASIC METABOLIC PNL TOTAL CA: CPT

## 2023-04-01 PROCEDURE — 85610 PROTHROMBIN TIME: CPT

## 2023-04-01 PROCEDURE — 85027 COMPLETE CBC AUTOMATED: CPT

## 2023-04-01 PROCEDURE — 84100 ASSAY OF PHOSPHORUS: CPT

## 2023-04-01 PROCEDURE — C1724: CPT

## 2023-04-01 PROCEDURE — 80053 COMPREHEN METABOLIC PANEL: CPT

## 2023-04-01 PROCEDURE — C1725: CPT

## 2023-04-01 PROCEDURE — 85025 COMPLETE CBC W/AUTO DIFF WBC: CPT

## 2023-04-01 PROCEDURE — C1887: CPT

## 2023-04-01 RX ORDER — ATORVASTATIN CALCIUM 80 MG/1
1 TABLET, FILM COATED ORAL
Qty: 0 | Refills: 0 | DISCHARGE

## 2023-04-01 RX ORDER — CLOPIDOGREL BISULFATE 75 MG/1
1 TABLET, FILM COATED ORAL
Qty: 30 | Refills: 11
Start: 2023-04-01 | End: 2024-03-25

## 2023-04-01 RX ORDER — TIMOLOL 0.5 %
1 DROPS OPHTHALMIC (EYE)
Qty: 1 | Refills: 0
Start: 2023-04-01 | End: 2023-04-30

## 2023-04-01 RX ORDER — CILOSTAZOL 100 MG/1
1 TABLET ORAL
Qty: 60 | Refills: 3
Start: 2023-04-01 | End: 2023-07-29

## 2023-04-01 RX ORDER — ASPIRIN/CALCIUM CARB/MAGNESIUM 324 MG
1 TABLET ORAL
Qty: 0 | Refills: 0 | DISCHARGE

## 2023-04-01 RX ORDER — ATORVASTATIN CALCIUM 80 MG/1
1 TABLET, FILM COATED ORAL
Qty: 30 | Refills: 3
Start: 2023-04-01 | End: 2023-07-29

## 2023-04-01 RX ORDER — MAGNESIUM SULFATE 500 MG/ML
1 VIAL (ML) INJECTION ONCE
Refills: 0 | Status: COMPLETED | OUTPATIENT
Start: 2023-04-01 | End: 2023-04-01

## 2023-04-01 RX ORDER — TIMOLOL 0.5 %
1 DROPS OPHTHALMIC (EYE)
Refills: 0 | DISCHARGE

## 2023-04-01 RX ORDER — ASPIRIN/CALCIUM CARB/MAGNESIUM 324 MG
1 TABLET ORAL
Qty: 30 | Refills: 11
Start: 2023-04-01 | End: 2024-03-25

## 2023-04-01 RX ORDER — METOPROLOL TARTRATE 50 MG
1 TABLET ORAL
Qty: 30 | Refills: 3
Start: 2023-04-01 | End: 2023-07-29

## 2023-04-01 RX ORDER — FUROSEMIDE 40 MG
1 TABLET ORAL
Qty: 30 | Refills: 0
Start: 2023-04-01 | End: 2023-04-30

## 2023-04-01 RX ORDER — FUROSEMIDE 40 MG
1 TABLET ORAL
Refills: 0 | DISCHARGE

## 2023-04-01 RX ADMIN — CLOPIDOGREL BISULFATE 75 MILLIGRAM(S): 75 TABLET, FILM COATED ORAL at 12:07

## 2023-04-01 RX ADMIN — Medication 100 GRAM(S): at 12:07

## 2023-04-01 RX ADMIN — Medication 20 MILLIGRAM(S): at 05:56

## 2023-04-01 RX ADMIN — Medication 667 MILLIGRAM(S): at 08:32

## 2023-04-01 RX ADMIN — CILOSTAZOL 50 MILLIGRAM(S): 100 TABLET ORAL at 05:56

## 2023-04-01 RX ADMIN — Medication 81 MILLIGRAM(S): at 12:07

## 2023-04-01 RX ADMIN — PANTOPRAZOLE SODIUM 40 MILLIGRAM(S): 20 TABLET, DELAYED RELEASE ORAL at 05:56

## 2023-04-01 RX ADMIN — Medication 667 MILLIGRAM(S): at 12:51

## 2023-04-01 RX ADMIN — Medication 50 MILLIGRAM(S): at 05:56

## 2023-04-01 NOTE — DISCHARGE NOTE NURSING/CASE MANAGEMENT/SOCIAL WORK - PATIENT PORTAL LINK FT
You can access the FollowMyHealth Patient Portal offered by [facility] by registering at the following website: [email or web address]. By joining [employer]’s FollowMyHealth portal, you will also be able to view your health information using other applications (apps) compatible with our system.

## 2023-04-01 NOTE — DISCHARGE NOTE PROVIDER - HOSPITAL COURSE
82 year old M with PMHx of CAD s/p CABGx4 ( SVG to LAD, D1, and OM3) in 1996 in [facility], DM II, HLD, HTN, PVD, PAD, CKD stage III (Cr 1.79 from 12/21/2022), glaucoma,  who presented to his outpatient cardiologist for f/u after ECHO and stress test. Pt complains of severe LE pain (L>R, onset 1 year ago that prevents him from completing his daily activities or walking and exercising regularly. Currently, he is unable to ambulate more than 2-3 blocks d/t leg pain. He was referred for vascular consult for RLE PVD d/t NELA 0.63 (7/19/22). In light of pt's katia factors and current symptoms, he is now referred to [facility] for LE angiography. Patient is s/p peripheral angiogram (3/31/23): CSI/PTCA mRSFA; ACCESS: LFA w/ PC.     No significant events on telemetry overnight. Repeat EKG without ischemic changes. Patient has been medically cleared for discharge as per Dr. Bullock. Patient has been given appropriate discharge instructions including medication regimen, access site management and follow up. Medications that patient needs refills on have been e-prescribed to preferred pharmacy.     D/C Meds: Aspirin 81 mg daily, Plavix 75 mg daily, Cilostazol 50 mg twice daily, Lasix 20 mg daily, Lisinopril 2.5 mg daily, Atorvastatin 40 mg daily, amlodipine 5 mg daily        82 year old M with PMHx of CAD s/p CABGx4 ( SVG to LAD, D1, and OM3) in 1996 in [facility], DM II, HLD, HTN, PVD, PAD, CKD stage III (Cr 1.79 from 12/21/2022), glaucoma,  who presented to his outpatient cardiologist for f/u after ECHO and stress test. Pt complains of severe LE pain (L>R, onset 1 year ago that prevents him from completing his daily activities or walking and exercising regularly. Currently, he is unable to ambulate more than 2-3 blocks d/t leg pain. He was referred for vascular consult for RLE PVD d/t NELA 0.63 (7/19/22). In light of pt's katia factors and current symptoms, he is now referred to [facility] for LE angiography. Patient is s/p peripheral angiogram (3/31/23): CSI/PTCA mRSFA; ACCESS: LFA w/ PC.     No significant events on telemetry overnight. Repeat EKG without ischemic changes. Patient has been medically cleared for discharge as per Dr. Bullock. Patient has been given appropriate discharge instructions including medication regimen, access site management and follow up. Medications that patient needs refills on have been e-prescribed to preferred pharmacy.     D/C Meds: Aspirin 81 mg daily, Plavix 75 mg daily, Cilostazol 50 mg twice daily, Lasix 20 mg daily (to be resumed on Monday 4/3/23), Lisinopril 2.5 mg daily, Atorvastatin 40 mg daily

## 2023-04-01 NOTE — DISCHARGE NOTE PROVIDER - NSDCMRMEDTOKEN_GEN_ALL_CORE_FT
aspirin 81 mg oral tablet: 1 tab(s) orally once a day  atorvastatin 10 mg oral tablet: 1 tab(s) orally once a day  calcium acetate 667 mg oral capsule: 3 cap(s) orally 3 times a day (with meals)  furosemide 20 mg oral tablet: 1 tab(s) orally once a day  Januvia 50 mg oral tablet: 1 tab(s) orally once a day  Lantus 100 units/mL subcutaneous solution: 25 unit(s) subcutaneous once a day (at bedtime)  latanoprost 0.005% ophthalmic emulsion: 1 in each affected eye  lisinopril 2.5 mg oral tablet: 1 tab(s) orally once a day  Lovaza 1000 mg oral capsule: 1 cap(s) orally  Metoprolol Succinate ER 50 mg oral tablet, extended release: 1 tab(s) orally once a day   NovoLOG 100 units/mL injectable solution: 22 unit(s) injectable 3 times a day (before meals)  omeprazole 20 mg oral delayed release capsule: 1 orally  tamsulosin 0.4 mg oral capsule: 1 tab(s) orally once a day (at bedtime)  timolol maleate 0.25% ophthalmic gel forming solution: 1 in each affected eye   Aspirin Enteric Coated 81 mg oral delayed release tablet: 1 tab(s) orally once a day  calcium acetate 667 mg oral capsule: 3 cap(s) orally 3 times a day (with meals)  cilostazol 50 mg oral tablet: 1 tab(s) orally every 12 hours  clopidogrel 75 mg oral tablet: 1 tab(s) orally once a day  furosemide 20 mg oral tablet: 1 tab(s) orally once a day Please do not rsume until 4/3/23  Januvia 50 mg oral tablet: 1 tab(s) orally once a day  Lantus 100 units/mL subcutaneous solution: 25 unit(s) subcutaneous once a day (at bedtime)  latanoprost 0.005% ophthalmic emulsion: 1 emulsion in each affected eye once a day  Lipitor 40 mg oral tablet: 1 tab(s) orally once a day (at bedtime)  lisinopril 2.5 mg oral tablet: 1 tab(s) orally once a day  Lovaza 1000 mg oral capsule: 1 cap(s) orally  Metoprolol Succinate ER 50 mg oral tablet, extended release: 1 tab(s) orally once a day  NovoLOG 100 units/mL injectable solution: 22 unit(s) injectable 3 times a day (before meals)  omeprazole 20 mg oral delayed release capsule: 1 orally  tamsulosin 0.4 mg oral capsule: 1 tab(s) orally once a day (at bedtime)  timolol maleate 0.25% ophthalmic gel forming solution: 1 gel forming solution in each affected eye once a day

## 2023-04-01 NOTE — DISCHARGE NOTE PROVIDER - NSDCFUSCHEDAPPT_GEN_ALL_CORE_FT
Gary Marina  [facility] Physician [facility]  CARDIOLOGY [address]  Scheduled Appointment: 04/17/2023

## 2023-04-01 NOTE — DISCHARGE NOTE PROVIDER - NSDCFUADDINST_GEN_ALL_CORE_FT
ACTIVITY:     Do not drive or operate hazardous machinery for 24 hours.                        Limit your physical activities for 24 hours.                        Do not engage in in sports, heavy work or heavy lifting for 72 hours.    DIET:             You may resume your regular diet.                        Drinking extra fluids (water, juice) is encouraged.                        Abstain from alcohol for 24 hours.    HYGIENE:     Shower and take off the puncture site dressing the next morning.                        If you received a "closure device" in your groin, you may shower the next day, but not take a bath, hot tub or swim for 5    days.    PAIN MEDICATION:   A mild pain at the puncture siteis not unusual.                                        You may take Tylenol 1-2 tabs every 4-6 hours as needed, for one day.                                        If the pain persists contact the office at [phone]    SPECIAL INSTRUCTIONS:  Signs and symptoms to look out for:       1. Tom bleeding from the puncture site is an emergency.            Put direct pressure on the site and go directly to your local Emergency   Room for treatment.       2. Bleeding under the skin may also occur and a small "black and blue may be expected.         If there appears to be an expanding mass or swelling around the puncture site, apply manual compression and go          immediately to your local Emergency Room for treatment.       3. If your foot/leg or hand/arm (puncture site) becomes cool or blue and/or you are unable to move it, this must be treated as an   emergency.         go directly to your local emergency room for treatment.       4. Excessive puncture site pain is abnormal and should be assessed.      5.  Look out for signs of infection in the puncture site: fever, red streaking of the leg, discharge, pain.      6.  Lack of adequate urine output, provided you are drinking enough fluids, may be cause for concern, notify us if you think this is the case.

## 2023-04-01 NOTE — DISCHARGE NOTE PROVIDER - CARE PROVIDER_API CALL
Triston Driver (MD)  Cardiovascular Disease; Interventional Cardiology  [address] [place] [address]  Phone: [phone]  Fax: [phone]  Follow Up Time: 1 week

## 2023-04-01 NOTE — DISCHARGE NOTE PROVIDER - NSDCCPCAREPLAN_GEN_ALL_CORE_FT
PRINCIPAL DISCHARGE DIAGNOSIS  Diagnosis: Peripheral artery disease  Assessment and Plan of Treatment: -You underwent a periphral catheterization on 3/31/23 and the blockage in your RIGHT SUPERFICIAL FEMORAL ARTERY was opened with balloon placement. You are to take ASPIRIN 81 mg daily and PLAVIX 75 mg daily. These medications work to keep your stents open.  NEVER MISS A DOSE OF ASPIRIN OR PLAVIX; YOU WEREE ALSO STARTED ON CILOSTAZOL 50 mg twice daily.   Your procedure was done through your groin. You do not need to keep this area covered and you may shower. Please avoid any heavy lifting  (no more than 3 to 5 lbs) or strenuous activity for five days. If you develop any swelling, bleeding, hardening of the skin (hematoma formation), acute pain, numbness/tingling  in your leg please contact your doctor immediately or call our 24/7 line: [phone] Please return to the hospital/seek immediate medical attention if worsening of symptoms- including not limited to chest pain, shortness of breath. Please follow up with Dr. Driver in 1-2 weeks. Please call his office and make an appointment to see him. Please continue a heart healthy diet low in sodium, cholesterol, and fat.        SECONDARY DISCHARGE DIAGNOSES  Diagnosis: Hypertension  Assessment and Plan of Treatment: You have a diagnosis of Hypertension or elevated blood pressure. Please continue taking your medications as listed to keep your blood pressure controlled. In addition, there are multiple lifestyle modifications that have been proven to lower blood pressure: maintaining a healthy body weight, engaging in regular physical activity for at least 30 minutes per day on most days, and consuming a diet rich in fruits, vegetables, and low-fat dairy products with a reduced amount of total and saturated fats and sodium. You were started on NORVASC (AMLODIPINE) 5 mg daily once daily. Please continue this Lisinopril 2.5 mg daily and TOPROL XL 50 mg daily.   For blood pressures at home that are too high or low please see your Doctor or go to the Emergency Room as necessary.      Diagnosis: Hyperlipidemia  Assessment and Plan of Treatment: Your LDL is  113 and your goal LDL is less than 70. LDL is also known as "bad cholesterol" because it takes cholesterol to your arteries, where it may collect in artery walls. Too much cholesterol in your arteries may lead to a buildup of plaque known as atherosclerosis and contribute to heart disease. Your Lipitor 10 mg was increased to LIPITOR 40 mg daily. Appropriate refills were sent to your preferred pharmacy. Please follow-up with your cardiologist for further management.       PRINCIPAL DISCHARGE DIAGNOSIS  Diagnosis: Peripheral artery disease  Assessment and Plan of Treatment: -You underwent a periphral catheterization on 3/31/23 and the blockage in your RIGHT SUPERFICIAL FEMORAL ARTERY was opened with balloon placement. You are to take ASPIRIN 81 mg daily and PLAVIX 75 mg daily. These medications work to keep your stents open.  NEVER MISS A DOSE OF ASPIRIN OR PLAVIX; YOU WERE ALSO STARTED ON CILOSTAZOL 50 mg twice daily.   Your procedure was done through your groin. You do not need to keep this area covered and you may shower. Please avoid any heavy lifting  (no more than 3 to 5 lbs) or strenuous activity for five days. If you develop any swelling, bleeding, hardening of the skin (hematoma formation), acute pain, numbness/tingling  in your leg please contact your doctor immediately or call our 24/7 line: [phone] Please return to the hospital/seek immediate medical attention if worsening of symptoms- including not limited to chest pain, shortness of breath. Please follow up with Dr. Driver in 1-2 weeks. Please call his office and make an appointment to see him. Please continue a heart healthy diet low in sodium, cholesterol, and fat.        SECONDARY DISCHARGE DIAGNOSES  Diagnosis: Hypertension  Assessment and Plan of Treatment: You have a diagnosis of Hypertension or elevated blood pressure. Please continue taking your medications as listed to keep your blood pressure controlled. In addition, there are multiple lifestyle modifications that have been proven to lower blood pressure: maintaining a healthy body weight, engaging in regular physical activity for at least 30 minutes per day on most days, and consuming a diet rich in fruits, vegetables, and low-fat dairy products with a reduced amount of total and saturated fats and sodium. Please continue Lisinopril 2.5 mg daily and TOPROL XL 50 mg daily.   For blood pressures at home that are too high or low please see your Doctor or go to the Emergency Room as necessary.      Diagnosis: Hyperlipidemia  Assessment and Plan of Treatment: Your LDL is 73 and your goal LDL is less than 70. LDL is also known as "bad cholesterol" because it takes cholesterol to your arteries, where it may collect in artery walls. Too much cholesterol in your arteries may lead to a buildup of plaque known as atherosclerosis and contribute to heart disease. Your Lipitor 10 mg was increased to LIPITOR 40 mg daily. Appropriate refills were sent to your preferred pharmacy. Please follow-up with your cardiologist for further management.       PRINCIPAL DISCHARGE DIAGNOSIS  Diagnosis: Peripheral artery disease  Assessment and Plan of Treatment: -You underwent a periphral catheterization on 3/31/23 and the blockage in your RIGHT SUPERFICIAL FEMORAL ARTERY was opened with balloon placement. You are to take ASPIRIN 81 mg daily and PLAVIX 75 mg daily. These medications work to keep your stents open.  NEVER MISS A DOSE OF ASPIRIN OR PLAVIX; YOU WERE ALSO STARTED ON CILOSTAZOL 50 mg twice daily.   Your procedure was done through your groin. You do not need to keep this area covered and you may shower. Please avoid any heavy lifting  (no more than 3 to 5 lbs) or strenuous activity for five days. If you develop any swelling, bleeding, hardening of the skin (hematoma formation), acute pain, numbness/tingling  in your leg please contact your doctor immediately or call our 24/7 line: [phone] Please return to the hospital/seek immediate medical attention if worsening of symptoms- including not limited to chest pain, shortness of breath. Please follow up with Dr. Driver in 1-2 weeks. Please call his office and make an appointment to see him. Please continue a heart healthy diet low in sodium, cholesterol, and fat.        SECONDARY DISCHARGE DIAGNOSES  Diagnosis: Hypertension  Assessment and Plan of Treatment: You have a diagnosis of Hypertension or elevated blood pressure. Please continue taking your medications as listed to keep your blood pressure controlled. In addition, there are multiple lifestyle modifications that have been proven to lower blood pressure: maintaining a healthy body weight, engaging in regular physical activity for at least 30 minutes per day on most days, and consuming a diet rich in fruits, vegetables, and low-fat dairy products with a reduced amount of total and saturated fats and sodium. Please continue Lisinopril 2.5 mg daily and TOPROL XL 50 mg daily.  Please do not resume your Lasix 20 mg daily until 4/4/23  For blood pressures at home that are too high or low please see your Doctor or go to the Emergency Room as necessary.      Diagnosis: Hyperlipidemia  Assessment and Plan of Treatment: Your LDL is 73 and your goal LDL is less than 70. LDL is also known as "bad cholesterol" because it takes cholesterol to your arteries, where it may collect in artery walls. Too much cholesterol in your arteries may lead to a buildup of plaque known as atherosclerosis and contribute to heart disease. Your Lipitor 10 mg was increased to LIPITOR 40 mg daily. Appropriate refills were sent to your preferred pharmacy. Please follow-up with your cardiologist for further management.

## 2023-04-01 NOTE — DISCHARGE NOTE NURSING/CASE MANAGEMENT/SOCIAL WORK - NSDCPEFALRISK_GEN_ALL_CORE
For information on Fall & Injury Prevention, visit: [email or web address] OR  [email or web address] OR  https://www.cdc.gov/steadi/patient.html

## 2023-04-09 DIAGNOSIS — Z79.4 LONG TERM (CURRENT) USE OF INSULIN: ICD-10-CM

## 2023-04-09 DIAGNOSIS — Z95.1 PRESENCE OF AORTOCORONARY BYPASS GRAFT: ICD-10-CM

## 2023-04-09 DIAGNOSIS — E78.5 HYPERLIPIDEMIA, UNSPECIFIED: ICD-10-CM

## 2023-04-09 DIAGNOSIS — Z79.82 LONG TERM (CURRENT) USE OF ASPIRIN: ICD-10-CM

## 2023-04-09 DIAGNOSIS — N18.30 CHRONIC KIDNEY DISEASE, STAGE 3 UNSPECIFIED: ICD-10-CM

## 2023-04-09 DIAGNOSIS — I25.10 ATHEROSCLEROTIC HEART DISEASE OF NATIVE CORONARY ARTERY WITHOUT ANGINA PECTORIS: ICD-10-CM

## 2023-04-09 DIAGNOSIS — Z79.84 LONG TERM (CURRENT) USE OF ORAL HYPOGLYCEMIC DRUGS: ICD-10-CM

## 2023-04-09 DIAGNOSIS — Z79.02 LONG TERM (CURRENT) USE OF ANTITHROMBOTICS/ANTIPLATELETS: ICD-10-CM

## 2023-04-09 DIAGNOSIS — H40.9 UNSPECIFIED GLAUCOMA: ICD-10-CM

## 2023-04-09 DIAGNOSIS — E11.51 TYPE 2 DIABETES MELLITUS WITH DIABETIC PERIPHERAL ANGIOPATHY WITHOUT GANGRENE: ICD-10-CM

## 2023-04-09 DIAGNOSIS — Z98.42 CATARACT EXTRACTION STATUS, LEFT EYE: ICD-10-CM

## 2023-04-09 DIAGNOSIS — E11.22 TYPE 2 DIABETES MELLITUS WITH DIABETIC CHRONIC KIDNEY DISEASE: ICD-10-CM

## 2023-04-09 DIAGNOSIS — I70.223 ATHEROSCLEROSIS OF NATIVE ARTERIES OF EXTREMITIES WITH REST PAIN, BILATERAL LEGS: ICD-10-CM

## 2023-04-09 DIAGNOSIS — I12.9 HYPERTENSIVE CHRONIC KIDNEY DISEASE WITH STAGE 1 THROUGH STAGE 4 CHRONIC KIDNEY DISEASE, OR UNSPECIFIED CHRONIC KIDNEY DISEASE: ICD-10-CM

## 2023-04-09 DIAGNOSIS — Z98.41 CATARACT EXTRACTION STATUS, RIGHT EYE: ICD-10-CM

## 2023-04-13 ENCOUNTER — TRANSCRIPTION ENCOUNTER (OUTPATIENT)
Age: 83
End: 2023-04-13

## 2023-04-13 DIAGNOSIS — E11.9 TYPE 2 DIABETES MELLITUS W/OUT COMPLICATIONS: ICD-10-CM

## 2023-04-13 DIAGNOSIS — N18.30 CHRONIC KIDNEY DISEASE, STAGE 3 UNSPECIFIED: ICD-10-CM

## 2023-04-13 DIAGNOSIS — E78.5 HYPERLIPIDEMIA, UNSPECIFIED: ICD-10-CM

## 2023-04-13 DIAGNOSIS — H40.9 UNSPECIFIED GLAUCOMA: ICD-10-CM

## 2023-04-13 RX ORDER — LISINOPRIL 2.5 MG/1
2.5 TABLET ORAL
Qty: 15 | Refills: 0 | Status: ACTIVE | COMMUNITY

## 2023-04-17 ENCOUNTER — APPOINTMENT (OUTPATIENT)
Dept: CARDIOLOGY | Facility: CLINIC | Age: 83
End: 2023-04-17
Payer: MEDICARE

## 2023-04-17 VITALS
HEIGHT: 64 IN | DIASTOLIC BLOOD PRESSURE: 80 MMHG | BODY MASS INDEX: 28.68 KG/M2 | OXYGEN SATURATION: 100 % | SYSTOLIC BLOOD PRESSURE: 146 MMHG | WEIGHT: 168 LBS | HEART RATE: 83 BPM

## 2023-04-17 DIAGNOSIS — Z95.820 PERIPHERAL VASCULAR ANGIOPLASTY STATUS WITH IMPLANTS AND GRAFTS: ICD-10-CM

## 2023-04-17 DIAGNOSIS — N40.0 BENIGN PROSTATIC HYPERPLASIA WITHOUT LOWER URINARY TRACT SYMPMS: ICD-10-CM

## 2023-04-17 DIAGNOSIS — U07.1 COVID-19: ICD-10-CM

## 2023-04-17 DIAGNOSIS — I73.9 PERIPHERAL VASCULAR DISEASE, UNSPECIFIED: ICD-10-CM

## 2023-04-17 DIAGNOSIS — I25.10 ATHEROSCLEROTIC HEART DISEASE OF NATIVE CORONARY ARTERY W/OUT ANGINA PECTORIS: ICD-10-CM

## 2023-04-17 DIAGNOSIS — I10 ESSENTIAL (PRIMARY) HYPERTENSION: ICD-10-CM

## 2023-04-17 DIAGNOSIS — R07.9 CHEST PAIN, UNSPECIFIED: ICD-10-CM

## 2023-04-17 DIAGNOSIS — Z78.9 OTHER SPECIFIED HEALTH STATUS: ICD-10-CM

## 2023-04-17 PROCEDURE — 99205 OFFICE O/P NEW HI 60 MIN: CPT | Mod: 25

## 2023-04-17 PROCEDURE — 93000 ELECTROCARDIOGRAM COMPLETE: CPT

## 2023-04-17 RX ORDER — LATANOPROST/PF 0.005 %
0.01 DROPS OPHTHALMIC (EYE)
Refills: 0 | Status: ACTIVE | COMMUNITY

## 2023-04-17 RX ORDER — SITAGLIPTIN 50 MG/1
50 TABLET, FILM COATED ORAL DAILY
Refills: 0 | Status: ACTIVE | COMMUNITY

## 2023-04-17 RX ORDER — CILOSTAZOL 50 MG/1
50 TABLET ORAL
Qty: 60 | Refills: 6 | Status: ACTIVE | COMMUNITY

## 2023-04-17 RX ORDER — TAMSULOSIN HYDROCHLORIDE 0.4 MG/1
0.4 CAPSULE ORAL
Qty: 30 | Refills: 5 | Status: ACTIVE | COMMUNITY

## 2023-04-17 RX ORDER — ASPIRIN ENTERIC COATED TABLETS 81 MG 81 MG/1
81 TABLET, DELAYED RELEASE ORAL
Qty: 30 | Refills: 11 | Status: ACTIVE | COMMUNITY

## 2023-04-17 RX ORDER — OMEGA-3-ACID ETHYL ESTERS 1 G/1
1 CAPSULE, LIQUID FILLED ORAL DAILY
Refills: 0 | Status: ACTIVE | COMMUNITY

## 2023-04-17 RX ORDER — CLOPIDOGREL BISULFATE 75 MG/1
75 TABLET, FILM COATED ORAL DAILY
Qty: 1 | Refills: 3 | Status: ACTIVE | COMMUNITY

## 2023-04-17 RX ORDER — NITROGLYCERIN 0.4 MG/1
0.4 TABLET SUBLINGUAL
Qty: 1 | Refills: 1 | Status: ACTIVE | COMMUNITY

## 2023-04-17 RX ORDER — TIMOLOL MALEATE 2.5 MG/ML
0.25 SOLUTION/ DROPS OPHTHALMIC
Refills: 0 | Status: ACTIVE | COMMUNITY

## 2023-04-17 RX ORDER — INSULIN ASPART 100 [IU]/ML
100 INJECTION, SOLUTION INTRAVENOUS; SUBCUTANEOUS
Refills: 0 | Status: ACTIVE | COMMUNITY

## 2023-04-17 RX ORDER — FENOFIBRATE 48 MG/1
48 TABLET ORAL DAILY
Qty: 90 | Refills: 3 | Status: ACTIVE | COMMUNITY

## 2023-04-17 RX ORDER — ICOSAPENT ETHYL 1000 MG/1
1 CAPSULE ORAL
Refills: 0 | Status: ACTIVE | COMMUNITY

## 2023-04-17 RX ORDER — CALCIUM ACETATE 667 MG/1
667 CAPSULE ORAL 3 TIMES DAILY
Refills: 0 | Status: ACTIVE | COMMUNITY

## 2023-04-17 RX ORDER — ATORVASTATIN CALCIUM 10 MG/1
10 TABLET, FILM COATED ORAL DAILY
Qty: 90 | Refills: 3 | Status: ACTIVE | COMMUNITY

## 2023-04-17 RX ORDER — OMEPRAZOLE 20 MG/1
20 CAPSULE, DELAYED RELEASE ORAL
Refills: 0 | Status: ACTIVE | COMMUNITY

## 2023-04-17 RX ORDER — METOPROLOL SUCCINATE 50 MG/1
50 TABLET, EXTENDED RELEASE ORAL
Qty: 90 | Refills: 1 | Status: ACTIVE | COMMUNITY

## 2023-04-17 RX ORDER — FAMOTIDINE 20 MG/1
20 TABLET, FILM COATED ORAL DAILY
Qty: 30 | Refills: 0 | Status: ACTIVE | COMMUNITY

## 2023-04-17 RX ORDER — CLOPIDOGREL BISULFATE 75 MG/1
75 TABLET, FILM COATED ORAL
Qty: 90 | Refills: 0 | Status: ACTIVE | COMMUNITY

## 2023-04-17 RX ORDER — INSULIN GLARGINE 100 [IU]/ML
100 INJECTION, SOLUTION SUBCUTANEOUS
Refills: 0 | Status: ACTIVE | COMMUNITY

## 2023-04-17 RX ORDER — FUROSEMIDE 20 MG/1
20 TABLET ORAL DAILY
Refills: 0 | Status: ACTIVE | COMMUNITY

## 2023-04-28 ENCOUNTER — TRANSCRIPTION ENCOUNTER (OUTPATIENT)
Age: 83
End: 2023-04-28

## 2023-06-06 PROBLEM — I73.9 PVD (PERIPHERAL VASCULAR DISEASE): Status: ACTIVE | Noted: 2023-04-13

## 2023-06-06 PROBLEM — Z95.820 S/P PERIPHERAL ARTERY ANGIOPLASTY WITH STENT PLACEMENT: Status: ACTIVE | Noted: 2023-04-17

## 2023-06-06 PROBLEM — I25.10 CORONARY ARTERY DISEASE: Status: ACTIVE | Noted: 2023-04-13

## 2023-06-06 PROBLEM — I10 BENIGN ESSENTIAL HYPERTENSION: Status: ACTIVE | Noted: 2023-04-13

## 2023-06-06 PROBLEM — R07.9 CHEST PAIN: Status: ACTIVE | Noted: 2023-04-17

## 2023-06-06 NOTE — ASSESSMENT
[FreeTextEntry1] : Coronary artery disease\par Hypertension\par Hyperlipidemia\par Peripheral arterial disease

## 2023-06-06 NOTE — DISCUSSION/SUMMARY
[FreeTextEntry1] : 1. C/w current management.\par 2. Follow up in 3-4 months. [EKG obtained to assist in diagnosis and management of assessed problem(s)] : EKG obtained to assist in diagnosis and management of assessed problem(s)

## 2023-06-06 NOTE — HISTORY OF PRESENT ILLNESS
[FreeTextEntry1] : Mr. Mills is 82 year old gentleman with history of CAD s/p CABGx4 ( SVG to LAD, D1, and OM3) in 1996 in \[facility], T2DM, HLD, HTN, PVD, PAD, CKD stage III here for f/u of CAD.\par Recently underwent LE angiogram and CSI/PTCA of R SFA at [facility]. Feeling better overall. No anginal symptoms. No cardiac symptoms. Notes compliance with cardiac meds.

## 2024-05-21 VITALS
TEMPERATURE: 97 F | HEIGHT: 64 IN | WEIGHT: 164.02 LBS | HEART RATE: 75 BPM | OXYGEN SATURATION: 97 % | SYSTOLIC BLOOD PRESSURE: 135 MMHG | DIASTOLIC BLOOD PRESSURE: 68 MMHG | RESPIRATION RATE: 16 BRPM

## 2024-05-21 RX ORDER — CLOPIDOGREL BISULFATE 75 MG/1
75 TABLET, FILM COATED ORAL DAILY
Refills: 0 | Status: DISCONTINUED | OUTPATIENT
Start: 2024-06-07 | End: 2024-06-21

## 2024-05-21 RX ORDER — OMEGA-3 ACID ETHYL ESTERS 1 G
1 CAPSULE ORAL
Refills: 0 | DISCHARGE

## 2024-05-21 RX ORDER — CHLORHEXIDINE GLUCONATE 213 G/1000ML
1 SOLUTION TOPICAL ONCE
Refills: 0 | Status: DISCONTINUED | OUTPATIENT
Start: 2024-06-07 | End: 2024-06-21

## 2024-05-21 RX ORDER — SODIUM CHLORIDE 9 MG/ML
1000 INJECTION INTRAMUSCULAR; INTRAVENOUS; SUBCUTANEOUS
Refills: 0 | Status: DISCONTINUED | OUTPATIENT
Start: 2024-06-07 | End: 2024-06-21

## 2024-05-21 NOTE — H&P ADULT - HISTORY OF PRESENT ILLNESS
Cardiologist:   Pharmacy:  Escort:    82 y/o M w/ history of CAD s/p CABG (20 yrs ago), PAD , DM, HTN, HLD and CKD whom presents to outpatient cardiologist Dr. Driver for routine follow up visit. Pt reports he has not been feeling well, endorses chest pain and SOB after walking half a block. He reports chest pain and SOB on and off with minimal activity and at rest. Pt uses SL nitroglycerin when chest pain starts and it helps. Patient denies palpitations, n/v, syncope, dizziness, edema, orthopnea. TTE 2/24/23 reveals LVEF 55-60%, mild grade 1 DD, mild PH, trace CA, Mild MR. NST 2/24/23 reveals medium sized, partially reversible, myocardial perfusion defect of inferior and lateral wall. 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 Post stress EF 47%. In light of multiple risk factors, CCS III anginal symptoms and abnormal NST, pt referred for cardiac cath with possible PCI if clinically indicated.     Cardiologist:   Pharmacy:  Escort:    84 y/o M w/ history of CAD s/p CABG (20 yrs ago), PAD s/p peripheral angiogram (3/31/23 CSI/PTCA mRSFA), DM, HTN, HLD and CKD whom presents to outpatient cardiologist Dr. Driver for routine follow up visit. Pt reports he has not been feeling well, endorses chest pain and SOB after walking half a block. He reports chest pain and SOB on and off with minimal activity and at rest. Pt uses SL nitroglycerin when chest pain starts and it helps. Patient denies palpitations, n/v, syncope, dizziness, edema, orthopnea. TTE 2/24/23 reveals LVEF 55-60%, mild grade 1 DD, mild PH, trace MT, Mild MR. NST 2/24/23 reveals medium sized, partially reversible, myocardial perfusion defect of inferior and lateral wall. 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 Post stress EF 47%. In light of multiple risk factors, CCS III anginal symptoms and abnormal NST, pt referred for cardiac cath with possible PCI if clinically indicated.     Cardiologist: Dr. Driver  Pharmacy: [facility]  Escort: Daughter    82 y/o M w/ history of CAD s/p CABG (20 yrs ago), PAD s/p peripheral angiogram (3/31/23 CSI/PTCA mRSFA), DM, HTN, HLD and CKD whom presents to outpatient cardiologist Dr. Driver for routine follow up visit. Pt reports he has not been feeling well, endorses chest pain and SOB after walking half a block. He reports chest pain and SOB on and off with minimal activity and at rest. Pt uses SL nitroglycerin when chest pain starts and it helps. Patient denies palpitations, n/v, syncope, dizziness, edema, orthopnea. TTE 2/24/23 reveals LVEF 55-60%, mild grade 1 DD, mild PH, trace SD, Mild MR. NST 2/24/23 reveals medium sized, partially reversible, myocardial perfusion defect of inferior and lateral wall. 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 Post stress EF 47%. In light of multiple risk factors, CCS III anginal symptoms and abnormal NST, pt referred for cardiac cath with possible PCI if clinically indicated.

## 2024-05-21 NOTE — H&P ADULT - NSHPLABSRESULTS_GEN_ALL_CORE
Outpt EKG 5/15/24: NSR 93, downsloping ST segment II, III, aVF.                           13.0   9.70  )-----------( 292      ( 07 Jun 2024 09:04 )             39.1       06-07    139  |  106  |  26<H>  ----------------------------<  159<H>  4.5   |  22  |  1.78<H>    Ca    9.5      07 Jun 2024 09:04  Mg     2.2     06-07    TPro  7.9  /  Alb  4.2  /  TBili  0.4  /  DBili  x   /  AST  17  /  ALT  8<L>  /  AlkPhos  71  06-07      PT/INR - ( 07 Jun 2024 09:04 )   PT: 12.0 sec;   INR: 1.05          PTT - ( 07 Jun 2024 09:04 )  PTT:29.3 sec    CARDIAC MARKERS ( 07 Jun 2024 09:04 )  x     / x     / 128 U/L / x     / 1.3 ng/mL      Urinalysis Basic - ( 07 Jun 2024 09:04 )    Color: x / Appearance: x / SG: x / pH: x  Gluc: 159 mg/dL / Ketone: x  / Bili: x / Urobili: x   Blood: x / Protein: x / Nitrite: x   Leuk Esterase: x / RBC: x / WBC x   Sq Epi: x / Non Sq Epi: x / Bacteria: x

## 2024-05-21 NOTE — H&P ADULT - ASSESSMENT
82 y/o M w/ history of CAD s/p CABG (20 yrs ago), PAD s/p peripheral angiogram (3/31/23 CSI/PTCA mRSFA), DM, HTN, HLD and CKD now presents to [facility] for recommended cardiac cath w/ possible intervention if clinically indicated, in light of pts risk factors, CCS class III anginal symptoms and abnormal NST.    -ASA 3, Mallampati 3  -VSS  -LOAD: Pt compliant w/ home Plavix 75mg daily, last dose 6/6/24. Load with Aspirin 325mg and Plavix 75mg PO x1 prior to cath. H/h 13/39. Denies any recent bleeding, hematuria hematochezia, BRBPR.   -PRECATH IVF: NS 500cc bolus then c/w maintenance NS @ 75cc/hr x 2hrs 2/2 Cr 1.78 (unknown baseline). Euvolemic on exam. EF 55-60%.   -Pre cath consent obtained by IC fellow.   -Suitable for moderate sedation    Risks & benefits of procedure and alternative therapy have been explained to the patient including but not limited to: allergic reaction, bleeding w/possible need for blood transfusion, infection, renal and vascular compromise, limb damage, arrhythmia, stroke, vessel dissection/perforation, Myocardial infarction, emergent CABG. Informed consent obtained and in chart.

## 2024-06-07 ENCOUNTER — OUTPATIENT (OUTPATIENT)
Dept: OUTPATIENT SERVICES | Facility: HOSPITAL | Age: 84
LOS: 1 days | Discharge: HOME | End: 2024-06-07
Payer: MEDICARE

## 2024-06-07 DIAGNOSIS — Z95.1 PRESENCE OF AORTOCORONARY BYPASS GRAFT: Chronic | ICD-10-CM

## 2024-06-07 DIAGNOSIS — H26.40 UNSPECIFIED SECONDARY CATARACT: Chronic | ICD-10-CM

## 2024-06-07 LAB
A1C WITH ESTIMATED AVERAGE GLUCOSE RESULT: 6.3 % — HIGH (ref 4–5.6)
ADD ON TEST-SPECIMEN IN LAB: SIGNIFICANT CHANGE UP
ALBUMIN SERPL ELPH-MCNC: 4.2 G/DL — SIGNIFICANT CHANGE UP (ref 3.3–5)
ALP SERPL-CCNC: 71 U/L — SIGNIFICANT CHANGE UP (ref 40–120)
ALT FLD-CCNC: 8 U/L — LOW (ref 10–45)
ANION GAP SERPL CALC-SCNC: 11 MMOL/L — SIGNIFICANT CHANGE UP (ref 5–17)
APTT BLD: 29.3 SEC — SIGNIFICANT CHANGE UP (ref 24.5–35.6)
AST SERPL-CCNC: 17 U/L — SIGNIFICANT CHANGE UP (ref 10–40)
BASOPHILS # BLD AUTO: 0.04 K/UL — SIGNIFICANT CHANGE UP (ref 0–0.2)
BASOPHILS NFR BLD AUTO: 0.4 % — SIGNIFICANT CHANGE UP (ref 0–2)
BILIRUB SERPL-MCNC: 0.4 MG/DL — SIGNIFICANT CHANGE UP (ref 0.2–1.2)
BUN SERPL-MCNC: 26 MG/DL — HIGH (ref 7–23)
CALCIUM SERPL-MCNC: 9.5 MG/DL — SIGNIFICANT CHANGE UP (ref 8.4–10.5)
CHLORIDE SERPL-SCNC: 106 MMOL/L — SIGNIFICANT CHANGE UP (ref 96–108)
CHOLEST SERPL-MCNC: 121 MG/DL — SIGNIFICANT CHANGE UP
CK MB CFR SERPL CALC: 1.3 NG/ML — SIGNIFICANT CHANGE UP (ref 0–6.7)
CK SERPL-CCNC: 128 U/L — SIGNIFICANT CHANGE UP (ref 30–200)
CO2 SERPL-SCNC: 22 MMOL/L — SIGNIFICANT CHANGE UP (ref 22–31)
CREAT SERPL-MCNC: 1.78 MG/DL — HIGH (ref 0.5–1.3)
EGFR: 38 ML/MIN/1.73M2 — LOW
EOSINOPHIL # BLD AUTO: 0.21 K/UL — SIGNIFICANT CHANGE UP (ref 0–0.5)
EOSINOPHIL NFR BLD AUTO: 2.2 % — SIGNIFICANT CHANGE UP (ref 0–6)
ESTIMATED AVERAGE GLUCOSE: 134 MG/DL — HIGH (ref 68–114)
GLUCOSE BLDC GLUCOMTR-MCNC: 117 MG/DL — HIGH (ref 70–99)
GLUCOSE BLDC GLUCOMTR-MCNC: 146 MG/DL — HIGH (ref 70–99)
GLUCOSE SERPL-MCNC: 159 MG/DL — HIGH (ref 70–99)
HCT VFR BLD CALC: 39.1 % — SIGNIFICANT CHANGE UP (ref 39–50)
HDLC SERPL-MCNC: 42 MG/DL — SIGNIFICANT CHANGE UP
HGB BLD-MCNC: 13 G/DL — SIGNIFICANT CHANGE UP (ref 13–17)
IMM GRANULOCYTES NFR BLD AUTO: 0.2 % — SIGNIFICANT CHANGE UP (ref 0–0.9)
INR BLD: 1.05 — SIGNIFICANT CHANGE UP (ref 0.85–1.18)
LIPID PNL WITH DIRECT LDL SERPL: 54 MG/DL — SIGNIFICANT CHANGE UP
LYMPHOCYTES # BLD AUTO: 1.84 K/UL — SIGNIFICANT CHANGE UP (ref 1–3.3)
LYMPHOCYTES # BLD AUTO: 19 % — SIGNIFICANT CHANGE UP (ref 13–44)
MAGNESIUM SERPL-MCNC: 2.2 MG/DL — SIGNIFICANT CHANGE UP (ref 1.6–2.6)
MCHC RBC-ENTMCNC: 27.6 PG — SIGNIFICANT CHANGE UP (ref 27–34)
MCHC RBC-ENTMCNC: 33.2 GM/DL — SIGNIFICANT CHANGE UP (ref 32–36)
MCV RBC AUTO: 83 FL — SIGNIFICANT CHANGE UP (ref 80–100)
MONOCYTES # BLD AUTO: 1.45 K/UL — HIGH (ref 0–0.9)
MONOCYTES NFR BLD AUTO: 14.9 % — HIGH (ref 2–14)
NEUTROPHILS # BLD AUTO: 6.14 K/UL — SIGNIFICANT CHANGE UP (ref 1.8–7.4)
NEUTROPHILS NFR BLD AUTO: 63.3 % — SIGNIFICANT CHANGE UP (ref 43–77)
NON HDL CHOLESTEROL: 79 MG/DL — SIGNIFICANT CHANGE UP
NRBC # BLD: 0 /100 WBCS — SIGNIFICANT CHANGE UP (ref 0–0)
PLATELET # BLD AUTO: 292 K/UL — SIGNIFICANT CHANGE UP (ref 150–400)
POTASSIUM SERPL-MCNC: 4.5 MMOL/L — SIGNIFICANT CHANGE UP (ref 3.5–5.3)
POTASSIUM SERPL-SCNC: 4.5 MMOL/L — SIGNIFICANT CHANGE UP (ref 3.5–5.3)
PROT SERPL-MCNC: 7.9 G/DL — SIGNIFICANT CHANGE UP (ref 6–8.3)
PROTHROM AB SERPL-ACNC: 12 SEC — SIGNIFICANT CHANGE UP (ref 9.5–13)
RBC # BLD: 4.71 M/UL — SIGNIFICANT CHANGE UP (ref 4.2–5.8)
RBC # FLD: 14.6 % — HIGH (ref 10.3–14.5)
SODIUM SERPL-SCNC: 139 MMOL/L — SIGNIFICANT CHANGE UP (ref 135–145)
TRIGL SERPL-MCNC: 148 MG/DL — SIGNIFICANT CHANGE UP
URATE SERPL-MCNC: 7 MG/DL — SIGNIFICANT CHANGE UP (ref 3.4–8.8)
WBC # BLD: 9.7 K/UL — SIGNIFICANT CHANGE UP (ref 3.8–10.5)
WBC # FLD AUTO: 9.7 K/UL — SIGNIFICANT CHANGE UP (ref 3.8–10.5)

## 2024-06-07 PROCEDURE — 80053 COMPREHEN METABOLIC PANEL: CPT

## 2024-06-07 PROCEDURE — 99152 MOD SED SAME PHYS/QHP 5/>YRS: CPT

## 2024-06-07 PROCEDURE — 82962 GLUCOSE BLOOD TEST: CPT

## 2024-06-07 PROCEDURE — C1769: CPT

## 2024-06-07 PROCEDURE — 82550 ASSAY OF CK (CPK): CPT

## 2024-06-07 PROCEDURE — C1887: CPT

## 2024-06-07 PROCEDURE — 93455 CORONARY ART/GRFT ANGIO S&I: CPT

## 2024-06-07 PROCEDURE — 80061 LIPID PANEL: CPT

## 2024-06-07 PROCEDURE — 85610 PROTHROMBIN TIME: CPT

## 2024-06-07 PROCEDURE — 84550 ASSAY OF BLOOD/URIC ACID: CPT

## 2024-06-07 PROCEDURE — 93455 CORONARY ART/GRFT ANGIO S&I: CPT | Mod: 26

## 2024-06-07 PROCEDURE — C1760: CPT

## 2024-06-07 PROCEDURE — C1894: CPT

## 2024-06-07 PROCEDURE — 36415 COLL VENOUS BLD VENIPUNCTURE: CPT

## 2024-06-07 PROCEDURE — 83735 ASSAY OF MAGNESIUM: CPT

## 2024-06-07 PROCEDURE — 83036 HEMOGLOBIN GLYCOSYLATED A1C: CPT

## 2024-06-07 PROCEDURE — 82553 CREATINE MB FRACTION: CPT

## 2024-06-07 PROCEDURE — 85025 COMPLETE CBC W/AUTO DIFF WBC: CPT

## 2024-06-07 PROCEDURE — 85730 THROMBOPLASTIN TIME PARTIAL: CPT

## 2024-06-07 RX ORDER — SITAGLIPTIN 50 MG/1
1 TABLET, FILM COATED ORAL
Qty: 0 | Refills: 0 | DISCHARGE

## 2024-06-07 RX ORDER — SODIUM CHLORIDE 9 MG/ML
1000 INJECTION, SOLUTION INTRAVENOUS
Refills: 0 | Status: DISCONTINUED | OUTPATIENT
Start: 2024-06-07 | End: 2024-06-21

## 2024-06-07 RX ORDER — RANOLAZINE 500 MG/1
1 TABLET, FILM COATED, EXTENDED RELEASE ORAL
Qty: 60 | Refills: 5
Start: 2024-06-07 | End: 2024-12-03

## 2024-06-07 RX ORDER — ISOSORBIDE MONONITRATE 60 MG/1
1 TABLET, EXTENDED RELEASE ORAL
Qty: 30 | Refills: 5
Start: 2024-06-07 | End: 2024-12-03

## 2024-06-07 RX ORDER — CALCIUM ACETATE 667 MG
3 TABLET ORAL
Qty: 0 | Refills: 0 | DISCHARGE

## 2024-06-07 RX ORDER — FAMOTIDINE 10 MG/ML
1 INJECTION INTRAVENOUS
Refills: 0 | DISCHARGE

## 2024-06-07 RX ORDER — DEXTROSE 50 % IN WATER 50 %
15 SYRINGE (ML) INTRAVENOUS ONCE
Refills: 0 | Status: DISCONTINUED | OUTPATIENT
Start: 2024-06-07 | End: 2024-06-21

## 2024-06-07 RX ORDER — SODIUM CHLORIDE 9 MG/ML
1000 INJECTION INTRAMUSCULAR; INTRAVENOUS; SUBCUTANEOUS
Refills: 0 | Status: DISCONTINUED | OUTPATIENT
Start: 2024-06-07 | End: 2024-06-21

## 2024-06-07 RX ORDER — TAMSULOSIN HYDROCHLORIDE 0.4 MG/1
1 CAPSULE ORAL
Refills: 0 | DISCHARGE

## 2024-06-07 RX ORDER — GLUCAGON INJECTION, SOLUTION 0.5 MG/.1ML
1 INJECTION, SOLUTION SUBCUTANEOUS ONCE
Refills: 0 | Status: DISCONTINUED | OUTPATIENT
Start: 2024-06-07 | End: 2024-06-21

## 2024-06-07 RX ORDER — DEXTROSE 50 % IN WATER 50 %
12.5 SYRINGE (ML) INTRAVENOUS ONCE
Refills: 0 | Status: DISCONTINUED | OUTPATIENT
Start: 2024-06-07 | End: 2024-06-21

## 2024-06-07 RX ORDER — FENOFIBRATE,MICRONIZED 130 MG
1 CAPSULE ORAL
Refills: 0 | DISCHARGE

## 2024-06-07 RX ORDER — INSULIN LISPRO 100/ML
VIAL (ML) SUBCUTANEOUS
Refills: 0 | Status: DISCONTINUED | OUTPATIENT
Start: 2024-06-07 | End: 2024-06-21

## 2024-06-07 RX ORDER — OMEPRAZOLE 10 MG/1
1 CAPSULE, DELAYED RELEASE ORAL
Refills: 0 | DISCHARGE

## 2024-06-07 RX ORDER — METOPROLOL TARTRATE 50 MG
1 TABLET ORAL
Refills: 0 | DISCHARGE

## 2024-06-07 RX ORDER — INSULIN GLARGINE 100 [IU]/ML
25 INJECTION, SOLUTION SUBCUTANEOUS
Qty: 0 | Refills: 0 | DISCHARGE

## 2024-06-07 RX ORDER — SODIUM CHLORIDE 9 MG/ML
250 INJECTION INTRAMUSCULAR; INTRAVENOUS; SUBCUTANEOUS ONCE
Refills: 0 | Status: DISCONTINUED | OUTPATIENT
Start: 2024-06-07 | End: 2024-06-07

## 2024-06-07 RX ORDER — SODIUM CHLORIDE 9 MG/ML
500 INJECTION INTRAMUSCULAR; INTRAVENOUS; SUBCUTANEOUS ONCE
Refills: 0 | Status: COMPLETED | OUTPATIENT
Start: 2024-06-07 | End: 2024-06-07

## 2024-06-07 RX ORDER — ASPIRIN/CALCIUM CARB/MAGNESIUM 324 MG
325 TABLET ORAL ONCE
Refills: 0 | Status: COMPLETED | OUTPATIENT
Start: 2024-06-07 | End: 2024-06-07

## 2024-06-07 RX ORDER — INSULIN ASPART 100 [IU]/ML
22 INJECTION, SOLUTION SUBCUTANEOUS
Refills: 0 | DISCHARGE

## 2024-06-07 RX ORDER — LATANOPROST 0.05 MG/ML
1 SOLUTION/ DROPS OPHTHALMIC; TOPICAL
Qty: 0 | Refills: 0 | DISCHARGE

## 2024-06-07 RX ORDER — INSULIN GLARGINE 100 [IU]/ML
23 INJECTION, SOLUTION SUBCUTANEOUS
Refills: 0 | DISCHARGE

## 2024-06-07 RX ORDER — LISINOPRIL 2.5 MG/1
1 TABLET ORAL
Qty: 0 | Refills: 0 | DISCHARGE

## 2024-06-07 RX ORDER — ATORVASTATIN CALCIUM 80 MG/1
1 TABLET, FILM COATED ORAL
Refills: 0 | DISCHARGE

## 2024-06-07 RX ORDER — DEXTROSE 50 % IN WATER 50 %
25 SYRINGE (ML) INTRAVENOUS ONCE
Refills: 0 | Status: DISCONTINUED | OUTPATIENT
Start: 2024-06-07 | End: 2024-06-21

## 2024-06-07 RX ORDER — DEXTROSE 10 % IN WATER 10 %
125 INTRAVENOUS SOLUTION INTRAVENOUS ONCE
Refills: 0 | Status: DISCONTINUED | OUTPATIENT
Start: 2024-06-07 | End: 2024-06-21

## 2024-06-07 RX ADMIN — CLOPIDOGREL BISULFATE 75 MILLIGRAM(S): 75 TABLET, FILM COATED ORAL at 10:29

## 2024-06-07 RX ADMIN — SODIUM CHLORIDE 1000 MILLILITER(S): 9 INJECTION INTRAMUSCULAR; INTRAVENOUS; SUBCUTANEOUS at 10:27

## 2024-06-07 RX ADMIN — SODIUM CHLORIDE 75 MILLILITER(S): 9 INJECTION INTRAMUSCULAR; INTRAVENOUS; SUBCUTANEOUS at 10:27

## 2024-06-07 RX ADMIN — Medication 325 MILLIGRAM(S): at 10:26

## 2024-06-07 RX ADMIN — SODIUM CHLORIDE 210 MILLILITER(S): 9 INJECTION INTRAMUSCULAR; INTRAVENOUS; SUBCUTANEOUS at 14:44

## 2024-06-07 NOTE — PROGRESS NOTE ADULT - SUBJECTIVE AND OBJECTIVE BOX
Interventional Cardiology  Post  Diagnostic Catheterization  Discharge Note      Patient without complaints. Ambulated and voided without difficulties    Afebrile, VSS    Ext:    		Right Groin: no hematoma bruit, dressing; C/D/I  	      Pulses:    intact DP/PT to baseline     A/P: 84 y/o M w/ history of CAD s/p CABG (20 yrs ago), PAD s/p peripheral angiogram (3/31/23 CSI/PTCA mRSFA), DM, HTN, HLD and CKD s/p C 6/7/24 that revealed mRCA 100%. Vein graft to RPDA occluded. dLM 99%. pLAD 100% complete total occlusion. LCx 90-95%. OM1/OM2 complete total occlusion. Vein graft to LAD with diffuse disease. Vein graft to D1 X. RFA 6Fr sheath removed with manual pressure. EDP 14. RF 6F manual pressure.     1. Follow-up with PMD/Cardiologist Dr. Driver  in 1week.   2. Pt given instructions on importance of post groin access site care. .    3.  Stable for discharge as per attending Dr. Driver after bed rest, pt voids, groin stable and 30 minutes of ambulation.        Interventional Cardiology  Post  Diagnostic Catheterization  Discharge Note      Patient without complaints. Ambulated and voided without difficulties    Afebrile, VSS    Ext:    		Right Groin: no hematoma bruit, dressing; C/D/I  	      Pulses:    intact DP/PT to baseline     A/P: 82 y/o M w/ history of CAD s/p CABG (20 yrs ago), PAD s/p peripheral angiogram (3/31/23 CSI/PTCA mRSFA), DM, HTN, HLD and CKD s/p [facility] 6/7/24 that revealed severe heart disease. LM with 60% heavily calcified. pLAD 80%, mid %. D1 with difuse disease. PLCx 50% diffuse disease. mid/distal LCx with 90% disease. OM1 small diffuse disease. OM2 small 99%. proxRCA 60%. midRCA 100%. L-R collateral to RCA from atrial by LCx.     SVG to PDA SVG to PDA diffuse disease. SVG     that revealed mRCA 100%. Vein graft to RPDA occluded. dLM 99%. pLAD 100% complete total occlusion. LCx 90-95%. OM1/OM2 complete total occlusion. Vein graft to LAD with diffuse disease. Vein graft to D1 X. RFA 6Fr sheath removed with manual pressure. EDP 14. RF 6F manual pressure.     1. Follow-up with PMD/Cardiologist Dr. Driver  in 1week.   2. Pt given instructions on importance of post groin access site care. .    3.  Stable for discharge as per attending Dr. Driver after bed rest, pt voids, groin stable and 30 minutes of ambulation.  4. Prescriptions for Imdur 30mg daily and Ranexa 500mg twice daily sent to pharmacy.        Interventional Cardiology  Post  Diagnostic Catheterization  Discharge Note      Patient without complaints. Ambulated and voided without difficulties    Afebrile, VSS    Ext:    		Right Groin: no hematoma bruit, dressing; C/D/I  	      Pulses:    intact DP/PT to baseline     A/P: 84 y/o M w/ history of CAD s/p CABG (20 yrs ago), PAD s/p peripheral angiogram (3/31/23 CSI/PTCA mRSFA), DM, HTN, HLD and CKD s/p [facility] 6/7/24 that revealed severe multi-vessel disease. LM with 60% heavily calcified. pLAD 80%, mid %. D1 with diffuse disease. pLCx 50% diffuse disease. mid/distal LCx with 90% disease. OM1 small diffuse disease. OM2 small 99%. proxRCA 60%. midRCA 100%. L-R collateral to RCA from atrial by LCx. SVG to PDA and SVG to %. SVG to LAD patent with diffuse disease. SVG to D1 patent with L-L collaterals to LAD. EDP 14. RF 6F removed with manual pressure.    1. Follow-up with PMD/Cardiologist Dr. Driver  in 1week.   2. Pt given instructions on importance of post groin access site care. .    3.  Stable for discharge as per attending Dr. Driver after bed rest, pt voids, groin stable and 30 minutes of ambulation.  4. Prescriptions for Imdur 30mg daily and Ranexa 500mg twice daily sent to pharmacy.

## 2024-06-12 DIAGNOSIS — I25.84 CORONARY ATHEROSCLEROSIS DUE TO CALCIFIED CORONARY LESION: ICD-10-CM

## 2024-06-12 DIAGNOSIS — I25.110 ATHEROSCLEROTIC HEART DISEASE OF NATIVE CORONARY ARTERY WITH UNSTABLE ANGINA PECTORIS: ICD-10-CM

## 2024-06-12 DIAGNOSIS — I25.82 CHRONIC TOTAL OCCLUSION OF CORONARY ARTERY: ICD-10-CM

## 2024-06-12 DIAGNOSIS — R94.39 ABNORMAL RESULT OF OTHER CARDIOVASCULAR FUNCTION STUDY: ICD-10-CM

## 2024-06-12 DIAGNOSIS — Z95.1 PRESENCE OF AORTOCORONARY BYPASS GRAFT: ICD-10-CM
